# Patient Record
Sex: MALE | Race: WHITE | Employment: OTHER | URBAN - METROPOLITAN AREA
[De-identification: names, ages, dates, MRNs, and addresses within clinical notes are randomized per-mention and may not be internally consistent; named-entity substitution may affect disease eponyms.]

---

## 2017-03-22 ENCOUNTER — HOSPITAL ENCOUNTER (OUTPATIENT)
Dept: CT IMAGING | Age: 62
Discharge: HOME | End: 2017-03-22
Attending: OTOLARYNGOLOGY
Payer: MEDICARE

## 2017-03-22 DIAGNOSIS — J32.9 CHRONIC SINUSITIS, UNSPECIFIED LOCATION: ICD-10-CM

## 2017-03-22 PROCEDURE — 70486 CT MAXILLOFACIAL W/O DYE: CPT

## 2019-08-19 ENCOUNTER — HOSPITAL ENCOUNTER (EMERGENCY)
Age: 64
Discharge: HOME | End: 2019-08-20
Attending: EMERGENCY MEDICINE
Payer: MEDICARE

## 2019-08-19 VITALS
OXYGEN SATURATION: 95 % | RESPIRATION RATE: 20 BRPM | HEIGHT: 73 IN | BODY MASS INDEX: 34.19 KG/M2 | TEMPERATURE: 101.4 F | HEART RATE: 108 BPM | DIASTOLIC BLOOD PRESSURE: 63 MMHG | WEIGHT: 258 LBS | SYSTOLIC BLOOD PRESSURE: 94 MMHG

## 2019-08-19 DIAGNOSIS — J01.01 ACUTE RECURRENT MAXILLARY SINUSITIS: Primary | ICD-10-CM

## 2019-08-19 PROCEDURE — 96372 THER/PROPH/DIAG INJ SC/IM: CPT | Performed by: EMERGENCY MEDICINE

## 2019-08-19 PROCEDURE — 74011250636 HC RX REV CODE- 250/636: Performed by: EMERGENCY MEDICINE

## 2019-08-19 PROCEDURE — 99282 EMERGENCY DEPT VISIT SF MDM: CPT | Performed by: EMERGENCY MEDICINE

## 2019-08-19 RX ORDER — CEFUROXIME AXETIL 500 MG/1
500 TABLET ORAL 2 TIMES DAILY
Qty: 20 TAB | Refills: 0 | Status: SHIPPED | OUTPATIENT
Start: 2019-08-19 | End: 2019-08-29

## 2019-08-19 RX ADMIN — LIDOCAINE HYDROCHLORIDE 1 G: 10 INJECTION, SOLUTION INFILTRATION; PERINEURAL at 23:53

## 2019-08-20 NOTE — ED TRIAGE NOTES
Pt states that he has had a sinus infection on and off for several months. Was treated with doxycycline but that has not helped. Continues to have a fever and taking motrin, ASA and nyquil without relief.   States that he has an appt with Dr Ana Menezes in Sept

## 2019-08-20 NOTE — ED PROVIDER NOTES
58-year-old male presenting for fever and sinus pressure. States that he has known sinus issues and gets sinusitis for 5 times a year. He is followed by Dr. Roe Campuzano and intends to have surgery on his sinuses because they do not drain well. Reports that 2 weeks ago he started having some sinus pressure and was seen at an urgent care where they treated him with doxycycline for 5 days in spite of telling them that he usually gets treated with cefuroxime for 10 days. Symptoms got a little bit better but then continued to worsen thereafter. The worsening headache and fever started today. He feels congestion in his face, in his ears and feels like something is draining down the back of his throat. This is very similar to prior episodes. He denies vision changes, hearing changes, nausea, vomiting, chest pain, shortness of breath, cough, or diarrhea. He has had no rashes. The history is provided by the patient. Fever    This is a recurrent problem. The current episode started yesterday. The problem occurs constantly. The maximum temperature noted was 102 - 102.9 F. The temperature was taken using an oral thermometer. Associated symptoms include congestion, headaches and sore throat. Pertinent negatives include no chest pain, no fussiness, no sleepiness, no diarrhea, no vomiting, no tugging at ear, no muscle aches, no cough, no shortness of breath, no mental status change, no neck pain, no rash and no urinary symptoms. He has tried nothing for the symptoms. The treatment provided no relief.         Past Medical History:   Diagnosis Date    Arthritis     Cancer [facility]) 2001    prostate    Chronic pain     back    Chronic sinusitis     GERD (gastroesophageal reflux disease)     controlled w/med    Hiatal hernia     Hypertension     controlled w/med    Nausea & vomiting     Personal history of prostate cancer     Psychiatric disorder     depression       Past Surgical History:   Procedure Laterality Date    BIOPSY PROSTATE      HX ANKLE FRACTURE [address]    ligament repair    HX APPENDECTOMY  1966    HX LUMBAR LAMINECTOMY  2001    L5-S1 microsurgery    HX PROSTATECTOMY  2001    LAP,CHOLECYSTECTOMY           Family History:   Problem Relation Age of Onset    Thyroid Disease Mother     Heart Disease Father     Hypertension Father     Cancer Maternal Uncle        Social History     Socioeconomic History    Marital status:      Spouse name: Not on file    Number of children: Not on file    Years of education: Not on file    Highest education level: Not on file   Occupational History    Not on file   Social Needs    Financial resource strain: Not on file    Food insecurity:     Worry: Not on file     Inability: Not on file    Transportation needs:     Medical: Not on file     Non-medical: Not on file   Tobacco Use    Smoking status: Never Smoker    Smokeless tobacco: Never Used   Substance and Sexual Activity    Alcohol use: Yes     Comment: rare    Drug use: No    Sexual activity: Not on file   Lifestyle    Physical activity:     Days per week: Not on file     Minutes per session: Not on file    Stress: Not on file   Relationships    Social connections:     Talks on phone: Not on file     Gets together: Not on file     Attends Tenriism service: Not on file     Active member of club or organization: Not on file     Attends meetings of clubs or organizations: Not on file     Relationship status: Not on file    Intimate partner violence:     Fear of current or ex partner: Not on file     Emotionally abused: Not on file     Physically abused: Not on file     Forced sexual activity: Not on file   Other Topics Concern    Not on file   Social History Narrative    Not on file         ALLERGIES: Pcn [penicillins]    Review of Systems   Constitutional: Positive for fever. HENT: Positive for congestion, sinus pressure, sinus pain and sore throat. Negative for trouble swallowing and voice change. Respiratory: Negative for cough and shortness of breath. Cardiovascular: Negative for chest pain. Gastrointestinal: Negative for diarrhea and vomiting. Musculoskeletal: Negative for neck pain. Skin: Negative for rash. Neurological: Positive for headaches. All other systems reviewed and are negative. Vitals:    08/19/19 2237   BP: 94/63   Pulse: (!) 108   Resp: 20   Temp: (!) 101.4 °F (38.6 °C)   SpO2: 95%   Weight: 117 kg (258 lb)   Height: 6' 1\" (1.854 m)            Physical Exam   Constitutional: He is oriented to person, place, and time. He appears well-developed and well-nourished. HENT:   Head: Normocephalic and atraumatic. Right Ear: External ear normal.   Left Ear: External ear normal.   Tenderness over the sinuses, left greater than right, maxillary worse than frontal   Eyes: Pupils are equal, round, and reactive to light. Conjunctivae and EOM are normal.   Neck: Normal range of motion. Neck supple. Cardiovascular: Normal rate, regular rhythm, normal heart sounds and intact distal pulses. Pulmonary/Chest: Effort normal and breath sounds normal.   Abdominal: Soft. Bowel sounds are normal.   Musculoskeletal: Normal range of motion. He exhibits no deformity. Neurological: He is alert and oriented to person, place, and time. No cranial nerve deficit. Skin: Skin is warm and dry. Psychiatric: He has a normal mood and affect. His behavior is normal.   Nursing note and vitals reviewed. MDM  Number of Diagnoses or Management Options  Acute recurrent maxillary sinusitis:   Diagnosis management comments: 59-year-old male presenting for recurrent sinus infection type symptoms and a fever. He appears clinically well. He tells me that he gets this for 5 times a year and thinks this is exactly like prior episodes.   Given how well the patient looks and his story of knowing that this is likely a sinus infection I discussed whether the patient wanted to just be started on antibiotics and avoid a major work-up. He expressed that he did. He reports that cefuroxime is what works for his sinus infections. We will give him an intramuscular shot of ceftriaxone here in the emergency department and discharged with a 10-day course of Ceftin. I clearly gave the patient return precautions. In addition to the above information the patient does report that he is a little bit shakier and probably has a slightly elevated heart rate because he is trying to get off of narcotic pain medications which she has been on for quite some time. He is doing this in conjunction with his pain management doctor.     Risk of Complications, Morbidity, and/or Mortality  Presenting problems: moderate  Diagnostic procedures: moderate  Management options: moderate    Patient Progress  Patient progress: improved         Procedures

## 2019-08-20 NOTE — DISCHARGE INSTRUCTIONS
Start the antibiotic that you reported to me works for you. Give it 48 hours. If you are not having any change in your symptoms or if they are worsening can always return for further evaluation. Otherwise follow-up with your ear nose and throat doctor at your earliest convenience.

## 2021-01-18 ENCOUNTER — HOSPITAL ENCOUNTER (EMERGENCY)
Age: 66
Discharge: LEFT BEFORE DISCHARGE | End: 2021-01-18
Attending: EMERGENCY MEDICINE
Payer: MEDICARE

## 2021-01-18 VITALS
HEART RATE: 104 BPM | OXYGEN SATURATION: 96 % | TEMPERATURE: 98.3 F | RESPIRATION RATE: 18 BRPM | DIASTOLIC BLOOD PRESSURE: 108 MMHG | WEIGHT: 260 LBS | BODY MASS INDEX: 34.46 KG/M2 | HEIGHT: 73 IN | SYSTOLIC BLOOD PRESSURE: 175 MMHG

## 2021-01-18 PROCEDURE — 75810000275 HC EMERGENCY DEPT VISIT NO LEVEL OF CARE

## 2021-01-18 NOTE — ED TRIAGE NOTES
Pt arrives ambulatory to triage with mask in place. Stating \"I should've said I had covid 19 and I would've got back faster\". States he has back pain \"up and down my spine and up and down my legs, I'm a chronic pain pt, I need a ct\". Reports his pain management doctor ordered a ct scan for him, but he came here instead. Pt reports he had injuries in the 80s-90s to his back. Reports he takes oxycodone and diazepam for pain at home. Last dose 0500 this morning. Reports he received injections last Thursday afternoon, that helped for 2 days and got worse again.

## 2021-02-15 ENCOUNTER — HOSPITAL ENCOUNTER (OUTPATIENT)
Dept: INTERVENTIONAL RADIOLOGY/VASCULAR | Age: 66
Discharge: HOME | End: 2021-02-15
Attending: ORTHOPAEDIC SURGERY

## 2021-02-19 ENCOUNTER — HOSPITAL ENCOUNTER (OUTPATIENT)
Dept: INTERVENTIONAL RADIOLOGY/VASCULAR | Age: 66
Discharge: HOME | End: 2021-02-19
Attending: ORTHOPAEDIC SURGERY
Payer: MEDICARE

## 2021-02-19 ENCOUNTER — HOSPITAL ENCOUNTER (OUTPATIENT)
Dept: CT IMAGING | Age: 66
Discharge: HOME | End: 2021-02-19
Attending: ORTHOPAEDIC SURGERY
Payer: MEDICARE

## 2021-02-19 VITALS
WEIGHT: 260 LBS | OXYGEN SATURATION: 95 % | SYSTOLIC BLOOD PRESSURE: 158 MMHG | BODY MASS INDEX: 34.46 KG/M2 | DIASTOLIC BLOOD PRESSURE: 72 MMHG | HEART RATE: 79 BPM | HEIGHT: 73 IN

## 2021-02-19 DIAGNOSIS — M51.26 DISPLACEMENT OF LUMBAR INTERVERTEBRAL DISC: ICD-10-CM

## 2021-02-19 PROCEDURE — 74011000636 HC RX REV CODE- 636: Performed by: PHYSICIAN ASSISTANT

## 2021-02-19 PROCEDURE — 74011250636 HC RX REV CODE- 250/636: Performed by: PHYSICIAN ASSISTANT

## 2021-02-19 PROCEDURE — 77030014143 HC TY PUNC LUMBR BD -A

## 2021-02-19 PROCEDURE — 62304 MYELOGRAPHY LUMBAR INJECTION: CPT

## 2021-02-19 PROCEDURE — 77030003666 HC NDL SPINAL BD -A

## 2021-02-19 PROCEDURE — 74011000250 HC RX REV CODE- 250: Performed by: PHYSICIAN ASSISTANT

## 2021-02-19 PROCEDURE — 72132 CT LUMBAR SPINE W/DYE: CPT

## 2021-02-19 RX ORDER — GUAIFENESIN 100 MG/5ML
81 LIQUID (ML) ORAL 2 TIMES DAILY
COMMUNITY

## 2021-02-19 RX ORDER — LIDOCAINE HYDROCHLORIDE 20 MG/ML
40-120 INJECTION, SOLUTION INFILTRATION; PERINEURAL ONCE
Status: COMPLETED | OUTPATIENT
Start: 2021-02-19 | End: 2021-02-19

## 2021-02-19 RX ORDER — HYDROMORPHONE HYDROCHLORIDE 1 MG/ML
1 INJECTION, SOLUTION INTRAMUSCULAR; INTRAVENOUS; SUBCUTANEOUS ONCE
Status: COMPLETED | OUTPATIENT
Start: 2021-02-19 | End: 2021-02-19

## 2021-02-19 RX ADMIN — LIDOCAINE HYDROCHLORIDE 80 MG: 20 INJECTION, SOLUTION INFILTRATION; PERINEURAL at 08:57

## 2021-02-19 RX ADMIN — IOPAMIDOL 8 ML: 408 INJECTION, SOLUTION INTRATHECAL at 09:02

## 2021-02-19 RX ADMIN — HYDROMORPHONE HYDROCHLORIDE 1 MG: 1 INJECTION, SOLUTION INTRAMUSCULAR; INTRAVENOUS; SUBCUTANEOUS at 09:15

## 2021-02-19 NOTE — DISCHARGE INSTRUCTIONS
[address]  Department of Interventional Radiology  [phone] Office  [phone] Fax  POST LUMBAR PUNCTURE/MYELOGRAM/INTRATHECAL CHEMOTHERAPY DISCHARGE INSTRUCTIONS  General Information:  Lumbar Puncture: A LP is done to help diagnose several disorders, like pseudo tumor, migraines, meningitis, and multiple sclerosis. It involves a puncture (usually in the lower spine) into the sac that protects the spinal column. A sample of the fluid in that space is removed and tested in the lab. Myelogram:     A Myelogram involves a lumbar puncture, and instead of removing fluid, contrast will be injected into the sac surrounding the spinal column. It is done to visualize the spinal column, nerve roots, spinal canal, vertebral discs and disc space. It is usually done to diagnose back pain with unknown cause or in preparation for surgery. After the injection, a CT scan will be done, usually within two hours of the injection. Intrathecal Chemotherapy:      Chemotherapy can be given in many forms. Intrathecal chemo involves a lumbar puncture, and instead of removing fluid, the chemo will be injected into the space. After any of procedures, you will be asked to lie flat on your back for 4-6 hours to prevent complications. You should also rest for 24 hours after you go home, and force fluids. If you have a headache, you should take Tylenol or acetaminophen. Call If:     You should call your Physician and/or the Radiology Nurse if you develop a headache that is not relieved by Tylenol, and worsens when you stand and eases when you lie down, you need to call. You may have developed what is referred to as a spinal headache. Our physician's will probably advise you to be on strict bed rest for 24 hours, to drink lots of fluids and caffeine. If this does not help the head pain, call again the next day.  You should call if you have bleeding other than a small spot on your bandage. You should call if you have any numbness, tingling, weakness, fever, chills, urinary retention, severe itching, rash, welts, swelling, or confusion. Follow-Up Instructions: See the doctor who ordered your procedure as he/she has instructed. If you had a Lumbar Puncture or Myelogram, your results should be available to your ordering doctor in 3-5 business days. You can remove your dressing in 24 hours and shower regularly. Do not bathe or swim for 72 hours. To Reach Us: If you have any questions about your procedure, please call the Interventional Radiology department at [phone]. After business hours (5pm) and weekends, call the answering service at [phone] and ask for the Radiologist on call to be paged. Si tiene Preguntas acerca del procedimiento, por favor llame al departamento de Radiología Intervencional al [phone]. Después de horas de oficina (5 pm) y los fines de [place], llamar al [facility] [address] al [phone] y pregunte por el Radiologo de [facility]. Interventional Radiology General Nurse Discharge    After general anesthesia or intravenous sedation, for 24 hours or while taking prescription Narcotics:  · Limit your activities  · Do not drive and operate hazardous machinery  · Do not make important personal or business decisions  · Do  not drink alcoholic beverages  · If you have not urinated within 8 hours after discharge, please contact your surgeon on call. * Please give a list of your current medications to your Primary Care Provider. * Please update this list whenever your medications are discontinued, doses are     changed, or new medications (including over-the-counter products) are added. * Please carry medication information at all times in case of emergency situations.     These are general instructions for a healthy lifestyle:    No smoking/ No tobacco products/ Avoid exposure to second hand smoke  Surgeon General's Warning:  Quitting smoking now greatly reduces serious risk to your health. Obesity, smoking, and sedentary lifestyle greatly increases your risk for illness  A healthy diet, regular physical exercise & weight monitoring are important for maintaining a healthy lifestyle    You may be retaining fluid if you have a history of heart failure or if you experience any of the following symptoms:  Weight gain of 3 pounds or more overnight or 5 pounds in a week, increased swelling in our hands or feet or shortness of breath while lying flat in bed. Please call your doctor as soon as you notice any of these symptoms; do not wait until your next office visit. Recognize signs and symptoms of STROKE:  F-face looks uneven    A-arms unable to move or move unevenly    S-speech slurred or non-existent    T-time-call 911 as soon as signs and symptoms begin-DO NOT go       Back to bed or wait to see if you get better-TIME IS BRAIN.     Patient Signature:  Date: 2/19/2021  Discharging Nurse: Silvia Galindo

## 2021-02-19 NOTE — PROGRESS NOTES
Recovery period without difficulty. Pt alert and oriented and denies pain. Dressing is clean, dry, and intact. Reviewed discharge instructions with patient and spouse, both verbalized understanding. Pt escorted to lobby discharge area via wheelchair. none

## 2021-03-11 ENCOUNTER — HOSPITAL ENCOUNTER (OUTPATIENT)
Dept: SURGERY | Age: 66
Discharge: HOME | End: 2021-03-11
Payer: MEDICARE

## 2021-03-11 VITALS
HEIGHT: 73 IN | WEIGHT: 271.6 LBS | DIASTOLIC BLOOD PRESSURE: 64 MMHG | OXYGEN SATURATION: 94 % | BODY MASS INDEX: 36 KG/M2 | RESPIRATION RATE: 20 BRPM | SYSTOLIC BLOOD PRESSURE: 114 MMHG | TEMPERATURE: 97.1 F | HEART RATE: 76 BPM

## 2021-03-11 LAB
BACTERIA SPEC CULT: NORMAL
SERVICE CMNT-IMP: NORMAL

## 2021-03-11 PROCEDURE — 87641 MR-STAPH DNA AMP PROBE: CPT

## 2021-03-11 RX ORDER — OXYBUTYNIN CHLORIDE 5 MG/1
1 TABLET ORAL AS NEEDED
COMMUNITY
Start: 2021-01-05

## 2021-03-11 RX ORDER — OXYCODONE HCL 10 MG/1
10 TABLET, FILM COATED, EXTENDED RELEASE ORAL EVERY 12 HOURS
COMMUNITY

## 2021-03-11 RX ORDER — ACETAMINOPHEN 500 MG
1000 TABLET ORAL
COMMUNITY

## 2021-03-11 RX ORDER — PAROXETINE HYDROCHLORIDE 20 MG/1
20 TABLET, FILM COATED ORAL
COMMUNITY

## 2021-03-11 NOTE — PERIOP NOTES
Patient confirms name and . Order to obtain consent found in EHR and matches case posting.    Pt verbalizes understanding of 1 visitor policy.    Type 1B surgery,  assessment complete.    Labs per surgeon: MRSA/MSSA nasal swab  Labs per anesthesia protocol: none  EKG: not indicated  Glucose:not indicated    Pt made aware of 1 visitor policy.    Patient verbalizes understanding to if not notified of appointment for COVID-19 swab within the 7 business days prior to surgery, call [phone]. Date Covid swab completed 3/11/21.    Medication list updated today.  Pt did not bring medication bottles or updated list today, but provided list of medications from memory to the best of their ability. Pt answered medical and surgical history questions to the best of their ability.    Hibiclens and instructions given per hospital policy with verbal instructions and handout.    Patient provided with and instructed on educational handouts including Guide to Surgery, Pain Management, Hand Hygiene, Blood Transfusion Education, and [facility] Brochure.    Patient answered medical/surgical history questions at their best of ability. All prior to admission medications documented in [facility].     Patient instructed on the following:  Arrive at MAIN Entrance, time of arrival to be called the day before by 1700  NPO after midnight including gum, mints, and ice chips.  Responsible adult must drive patient to the hospital, stay during surgery, and patient will require supervision 24 hours after anesthesia.  Use hibiclens in shower the night before surgery and on the morning of surgery.  Leave all valuables (money and jewelry) at home but bring insurance card and ID on DOS.  Do not wear make-up, nail polish, lotions, cologne, perfumes, powders, or oil on skin.    Patient teach back successful and patient demonstrates knowledge of instruction.

## 2021-03-11 NOTE — PERIOP NOTES
PLEASE CONTINUE TAKING ALL PRESCRIPTION MEDICATIONS UP TO THE DAY OF SURGERY UNLESS OTHERWISE DIRECTED BELOW. DISCONTINUE all vitamins and supplements 7 days prior to surgery. DISCONTINUE Non-Steriodal Anti-Inflammatory (NSAIDS) such as Advil and Aleve 5 days prior to surgery. Home Medications to take  the day of surgery   Valium / diazepam  Oxycodone   Paxil / paroxetine  Prevacid/ lansoprazole      Oxybutynin if needed     Home Medications   to Hold    Aspirin - hold 5 days prior to surgery      DISCONTINUE all vitamins and supplements 7 days prior to surgery. DISCONTINUE Non-Steriodal Anti-Inflammatory (NSAIDS) such as Advil and Aleve 5 days prior to surgery. Comments         *Visitor policy of 1 visitor per patient discussed. Please do not bring home medications with you on the day of surgery unless otherwise directed by your nurse. If you are instructed to bring home medications, please give them to your nurse as they will be administered by the nursing staff. If you have any questions, please call [facility] [phone] or [facility] [phone]. A copy of this note was provided to the patient for reference.

## 2021-03-12 NOTE — H&P
Medications:allopurinol (300 mg , .);ibuprofen (400 mg , .);lansoprazole (30 mg , .);lisinopril (20 mg , .);oxybutynin chloride (5 mg , .);oxycodone (10 mg , .);paroxetine HCl (20 mg , .)    CC: LOW BACK AND RIGHT LEG PAIN    HPI:  The patient is a 49-year-old gentleman. He has chronic pain. He sees the [facility] for Pain. I am not really sure what his pain issue is, but I think they never really found anything really significant. Starting 01/16/2021, he started having acute worsening pain. It is the worse pain he has ever had. It is going down the right leg in an L5 distribution to the top of the foot with numbness and tingling. No left leg pain. This has really incapacitated him. He does have a spinal cord stimulator placed in 2018 which helped about 65% with his pain. His new pain has been much worse than anything he has had. Could not get an MRI scan so we got a myelogram CT scan. I reviewed it today. It shows a right-sided L4-5 disc herniation with a fragment extending down the back of the L5 body. This would account for his pain. He says he has already had an L5 block that did not help. We talked about the possibility of doing more injections, waiting or doing surgery. He says he cannot put up with this anymore. He needs surgery. I told him it would a right-sided L4-5 laminectomy discectomy outpatient with possibly an overnight stay. He will have restrictions for 6 weeks. No bending, lifting, strenuous activity for 6 weeks until the annulus is healed. I have talked to he and his wife in detail. I went through all the risks, including death, paralysis, infection, bleeding, transfusion, heart attack, stroke, clot in the leg, clot in the lung, dural tear, recurrent disc herniation and failure to get  pain relief. We went through his medical history. He has some chronic renal failure from antiinflammatory use. He takes two 81 mg aspirins a day for a TIA I think he had in 2005.   He has not had one since. I would want him to stop the aspirin 10 days in advance. Exam:   Head: Normocephalic and atraumatic. Eyes: Conjunctivae are normal. PERRLA   Neck: Normal range of motion. Neck supple. Cardiovascular: Regular rate and rhythm, S1S2 present or without murmur or extra heart sounds  Pulmonary/Chest: chest clear, no wheezing, rales, normal symmetric air entry. Abdominal: soft, non-tender, without masses or organomegaly, normal bowel sounds      ASSESSMENT AND PLAN:  We will see about lining him up for the right L4-5 laminotomy discectomy. He tells me that he is normally on chronic medication OxyContin 10 mg b.i.d. and oxycodone 5 mg q. 8 hours. With the last prescription, Ty did not have the OxyContin so he has run out and his pain is markedly worse. He has tried to get in touch with the [facility] for Pain, but has been unable. Normally gets his medications sent by [facility] on [address]. Since they do not have the medication, they called around and they do have it at [facility] on [address]. I will see about contacting one of the doctors or nurse practitioners to see if they can send that prescription for the oxycodone extended release to [facility] on [address]. I will see him at surgery.     Surgery: Right L4-5 laminotomy, discectomy    Electronically Signed By Nati Osman MD

## 2021-03-17 ENCOUNTER — ANESTHESIA EVENT (OUTPATIENT)
Dept: SURGERY | Age: 66
End: 2021-03-17
Payer: MEDICARE

## 2021-03-18 ENCOUNTER — APPOINTMENT (OUTPATIENT)
Dept: GENERAL RADIOLOGY | Age: 66
End: 2021-03-18
Attending: ORTHOPAEDIC SURGERY
Payer: MEDICARE

## 2021-03-18 ENCOUNTER — HOSPITAL ENCOUNTER (OUTPATIENT)
Age: 66
Setting detail: OUTPATIENT SURGERY
Discharge: HOME | End: 2021-03-18
Attending: ORTHOPAEDIC SURGERY | Admitting: ORTHOPAEDIC SURGERY
Payer: MEDICARE

## 2021-03-18 ENCOUNTER — ANESTHESIA (OUTPATIENT)
Dept: SURGERY | Age: 66
End: 2021-03-18
Payer: MEDICARE

## 2021-03-18 VITALS
OXYGEN SATURATION: 98 % | WEIGHT: 271 LBS | RESPIRATION RATE: 16 BRPM | HEART RATE: 96 BPM | TEMPERATURE: 97.8 F | SYSTOLIC BLOOD PRESSURE: 148 MMHG | DIASTOLIC BLOOD PRESSURE: 72 MMHG | BODY MASS INDEX: 35.92 KG/M2 | HEIGHT: 73 IN

## 2021-03-18 DIAGNOSIS — M51.26 LUMBAR HERNIATED DISC: Primary | ICD-10-CM

## 2021-03-18 LAB — HGB BLD-MCNC: 15.4 G/DL (ref 13.6–17.2)

## 2021-03-18 PROCEDURE — 77030038552 HC DRN WND MDII -A: Performed by: ORTHOPAEDIC SURGERY

## 2021-03-18 PROCEDURE — 77030040922 HC BLNKT HYPOTHRM STRY -A: Performed by: ANESTHESIOLOGY

## 2021-03-18 PROCEDURE — 74011000250 HC RX REV CODE- 250: Performed by: REGISTERED NURSE

## 2021-03-18 PROCEDURE — 76210000021 HC REC RM PH II 0.5 TO 1 HR: Performed by: ORTHOPAEDIC SURGERY

## 2021-03-18 PROCEDURE — 76010000171 HC OR TIME 2 TO 2.5 HR INTENSV-TIER 1: Performed by: ORTHOPAEDIC SURGERY

## 2021-03-18 PROCEDURE — 72020 X-RAY EXAM OF SPINE 1 VIEW: CPT

## 2021-03-18 PROCEDURE — 77030019908 HC STETH ESOPH SIMS -A: Performed by: ANESTHESIOLOGY

## 2021-03-18 PROCEDURE — 76060000035 HC ANESTHESIA 2 TO 2.5 HR: Performed by: ORTHOPAEDIC SURGERY

## 2021-03-18 PROCEDURE — 74011000250 HC RX REV CODE- 250: Performed by: ORTHOPAEDIC SURGERY

## 2021-03-18 PROCEDURE — 77030031139 HC SUT VCRL2 J&J -A: Performed by: ORTHOPAEDIC SURGERY

## 2021-03-18 PROCEDURE — 85018 HEMOGLOBIN: CPT

## 2021-03-18 PROCEDURE — 76210000017 HC OR PH I REC 1.5 TO 2 HR: Performed by: ORTHOPAEDIC SURGERY

## 2021-03-18 PROCEDURE — 77030039425 HC BLD LARYNG TRULITE DISP TELE -A: Performed by: ANESTHESIOLOGY

## 2021-03-18 PROCEDURE — 77030018673: Performed by: ORTHOPAEDIC SURGERY

## 2021-03-18 PROCEDURE — 74011250636 HC RX REV CODE- 250/636: Performed by: REGISTERED NURSE

## 2021-03-18 PROCEDURE — 63030 LAMOT DCMPRN NRV RT 1 LMBR: CPT | Performed by: ORTHOPAEDIC SURGERY

## 2021-03-18 PROCEDURE — 74011250636 HC RX REV CODE- 250/636: Performed by: ORTHOPAEDIC SURGERY

## 2021-03-18 PROCEDURE — 74011000272 HC RX REV CODE- 272: Performed by: ORTHOPAEDIC SURGERY

## 2021-03-18 PROCEDURE — 77030037088 HC TUBE ENDOTRACH ORAL NSL COVD-A: Performed by: ANESTHESIOLOGY

## 2021-03-18 PROCEDURE — 74011000250 HC RX REV CODE- 250: Performed by: NURSE ANESTHETIST, CERTIFIED REGISTERED

## 2021-03-18 PROCEDURE — 74011250637 HC RX REV CODE- 250/637

## 2021-03-18 PROCEDURE — 2709999900 HC NON-CHARGEABLE SUPPLY: Performed by: ORTHOPAEDIC SURGERY

## 2021-03-18 PROCEDURE — 77030012894: Performed by: ORTHOPAEDIC SURGERY

## 2021-03-18 PROCEDURE — 74011250637 HC RX REV CODE- 250/637: Performed by: ANESTHESIOLOGY

## 2021-03-18 PROCEDURE — 74011250636 HC RX REV CODE- 250/636: Performed by: NURSE ANESTHETIST, CERTIFIED REGISTERED

## 2021-03-18 PROCEDURE — 77030025623 HC BUR RND PRECIS STRY -D: Performed by: ORTHOPAEDIC SURGERY

## 2021-03-18 PROCEDURE — 77030029099 HC BN WAX SSPC -A: Performed by: ORTHOPAEDIC SURGERY

## 2021-03-18 PROCEDURE — 74011250636 HC RX REV CODE- 250/636: Performed by: ANESTHESIOLOGY

## 2021-03-18 PROCEDURE — 77030028270 HC SRGFL HEMSTAT MTRX J&J -C: Performed by: ORTHOPAEDIC SURGERY

## 2021-03-18 RX ORDER — LIDOCAINE HYDROCHLORIDE 20 MG/ML
INJECTION, SOLUTION EPIDURAL; INFILTRATION; INTRACAUDAL; PERINEURAL AS NEEDED
Status: DISCONTINUED | OUTPATIENT
Start: 2021-03-18 | End: 2021-03-18 | Stop reason: HOSPADM

## 2021-03-18 RX ORDER — FENTANYL CITRATE 50 UG/ML
INJECTION, SOLUTION INTRAMUSCULAR; INTRAVENOUS AS NEEDED
Status: DISCONTINUED | OUTPATIENT
Start: 2021-03-18 | End: 2021-03-18 | Stop reason: HOSPADM

## 2021-03-18 RX ORDER — SODIUM CHLORIDE, SODIUM LACTATE, POTASSIUM CHLORIDE, CALCIUM CHLORIDE 600; 310; 30; 20 MG/100ML; MG/100ML; MG/100ML; MG/100ML
75 INJECTION, SOLUTION INTRAVENOUS CONTINUOUS
Status: DISCONTINUED | OUTPATIENT
Start: 2021-03-18 | End: 2021-03-18 | Stop reason: HOSPADM

## 2021-03-18 RX ORDER — NEOSTIGMINE METHYLSULFATE 1 MG/ML
INJECTION, SOLUTION INTRAVENOUS AS NEEDED
Status: DISCONTINUED | OUTPATIENT
Start: 2021-03-18 | End: 2021-03-18 | Stop reason: HOSPADM

## 2021-03-18 RX ORDER — VANCOMYCIN HYDROCHLORIDE 1 G/20ML
INJECTION, POWDER, LYOPHILIZED, FOR SOLUTION INTRAVENOUS AS NEEDED
Status: DISCONTINUED | OUTPATIENT
Start: 2021-03-18 | End: 2021-03-18 | Stop reason: HOSPADM

## 2021-03-18 RX ORDER — MIDAZOLAM HYDROCHLORIDE 1 MG/ML
2 INJECTION, SOLUTION INTRAMUSCULAR; INTRAVENOUS ONCE
Status: DISCONTINUED | OUTPATIENT
Start: 2021-03-18 | End: 2021-03-18 | Stop reason: HOSPADM

## 2021-03-18 RX ORDER — EPHEDRINE SULFATE/0.9% NACL/PF 50 MG/5 ML
SYRINGE (ML) INTRAVENOUS AS NEEDED
Status: DISCONTINUED | OUTPATIENT
Start: 2021-03-18 | End: 2021-03-18 | Stop reason: HOSPADM

## 2021-03-18 RX ORDER — CEPHALEXIN 500 MG/1
500 CAPSULE ORAL 3 TIMES DAILY
Qty: 10 CAP | Refills: 0 | Status: SHIPPED | OUTPATIENT
Start: 2021-03-18 | End: 2021-03-28

## 2021-03-18 RX ORDER — ACETAMINOPHEN 500 MG
1000 TABLET ORAL ONCE
Status: DISCONTINUED | OUTPATIENT
Start: 2021-03-18 | End: 2021-03-18 | Stop reason: HOSPADM

## 2021-03-18 RX ORDER — HYDROMORPHONE HYDROCHLORIDE 2 MG/ML
INJECTION, SOLUTION INTRAMUSCULAR; INTRAVENOUS; SUBCUTANEOUS AS NEEDED
Status: DISCONTINUED | OUTPATIENT
Start: 2021-03-18 | End: 2021-03-18 | Stop reason: HOSPADM

## 2021-03-18 RX ORDER — FENTANYL CITRATE 50 UG/ML
100 INJECTION, SOLUTION INTRAMUSCULAR; INTRAVENOUS AS NEEDED
Status: DISCONTINUED | OUTPATIENT
Start: 2021-03-18 | End: 2021-03-18 | Stop reason: HOSPADM

## 2021-03-18 RX ORDER — OXYCODONE HYDROCHLORIDE 5 MG/1
5 TABLET ORAL
Status: COMPLETED | OUTPATIENT
Start: 2021-03-18 | End: 2021-03-18

## 2021-03-18 RX ORDER — SODIUM CHLORIDE, SODIUM LACTATE, POTASSIUM CHLORIDE, CALCIUM CHLORIDE 600; 310; 30; 20 MG/100ML; MG/100ML; MG/100ML; MG/100ML
INJECTION, SOLUTION INTRAVENOUS
Status: DISCONTINUED | OUTPATIENT
Start: 2021-03-18 | End: 2021-03-18 | Stop reason: HOSPADM

## 2021-03-18 RX ORDER — LIDOCAINE HYDROCHLORIDE AND EPINEPHRINE 10; 10 MG/ML; UG/ML
INJECTION, SOLUTION INFILTRATION; PERINEURAL AS NEEDED
Status: DISCONTINUED | OUTPATIENT
Start: 2021-03-18 | End: 2021-03-18 | Stop reason: HOSPADM

## 2021-03-18 RX ORDER — DEXAMETHASONE SODIUM PHOSPHATE 4 MG/ML
INJECTION, SOLUTION INTRA-ARTICULAR; INTRALESIONAL; INTRAMUSCULAR; INTRAVENOUS; SOFT TISSUE AS NEEDED
Status: DISCONTINUED | OUTPATIENT
Start: 2021-03-18 | End: 2021-03-18 | Stop reason: HOSPADM

## 2021-03-18 RX ORDER — KETAMINE HYDROCHLORIDE 50 MG/ML
INJECTION, SOLUTION INTRAMUSCULAR; INTRAVENOUS AS NEEDED
Status: DISCONTINUED | OUTPATIENT
Start: 2021-03-18 | End: 2021-03-18 | Stop reason: HOSPADM

## 2021-03-18 RX ORDER — DIPHENHYDRAMINE HYDROCHLORIDE 50 MG/ML
12.5 INJECTION, SOLUTION INTRAMUSCULAR; INTRAVENOUS
Status: DISCONTINUED | OUTPATIENT
Start: 2021-03-18 | End: 2021-03-18 | Stop reason: HOSPADM

## 2021-03-18 RX ORDER — ROCURONIUM BROMIDE 10 MG/ML
INJECTION, SOLUTION INTRAVENOUS AS NEEDED
Status: DISCONTINUED | OUTPATIENT
Start: 2021-03-18 | End: 2021-03-18 | Stop reason: HOSPADM

## 2021-03-18 RX ORDER — ONDANSETRON 2 MG/ML
INJECTION INTRAMUSCULAR; INTRAVENOUS AS NEEDED
Status: DISCONTINUED | OUTPATIENT
Start: 2021-03-18 | End: 2021-03-18 | Stop reason: HOSPADM

## 2021-03-18 RX ORDER — SODIUM CHLORIDE, SODIUM LACTATE, POTASSIUM CHLORIDE, CALCIUM CHLORIDE 600; 310; 30; 20 MG/100ML; MG/100ML; MG/100ML; MG/100ML
100 INJECTION, SOLUTION INTRAVENOUS CONTINUOUS
Status: DISCONTINUED | OUTPATIENT
Start: 2021-03-18 | End: 2021-03-18 | Stop reason: HOSPADM

## 2021-03-18 RX ORDER — LIDOCAINE HYDROCHLORIDE 10 MG/ML
0.1 INJECTION INFILTRATION; PERINEURAL AS NEEDED
Status: DISCONTINUED | OUTPATIENT
Start: 2021-03-18 | End: 2021-03-18 | Stop reason: HOSPADM

## 2021-03-18 RX ORDER — HYDROMORPHONE HYDROCHLORIDE 1 MG/ML
0.5 INJECTION, SOLUTION INTRAMUSCULAR; INTRAVENOUS; SUBCUTANEOUS
Status: DISCONTINUED | OUTPATIENT
Start: 2021-03-18 | End: 2021-03-18 | Stop reason: HOSPADM

## 2021-03-18 RX ORDER — PROPOFOL 10 MG/ML
INJECTION, EMULSION INTRAVENOUS AS NEEDED
Status: DISCONTINUED | OUTPATIENT
Start: 2021-03-18 | End: 2021-03-18 | Stop reason: HOSPADM

## 2021-03-18 RX ORDER — NALOXONE HYDROCHLORIDE 0.4 MG/ML
0.1 INJECTION, SOLUTION INTRAMUSCULAR; INTRAVENOUS; SUBCUTANEOUS AS NEEDED
Status: DISCONTINUED | OUTPATIENT
Start: 2021-03-18 | End: 2021-03-18 | Stop reason: HOSPADM

## 2021-03-18 RX ORDER — FLUMAZENIL 0.1 MG/ML
0.2 INJECTION INTRAVENOUS
Status: DISCONTINUED | OUTPATIENT
Start: 2021-03-18 | End: 2021-03-18 | Stop reason: HOSPADM

## 2021-03-18 RX ORDER — GLYCOPYRROLATE 0.2 MG/ML
INJECTION INTRAMUSCULAR; INTRAVENOUS AS NEEDED
Status: DISCONTINUED | OUTPATIENT
Start: 2021-03-18 | End: 2021-03-18 | Stop reason: HOSPADM

## 2021-03-18 RX ADMIN — KETAMINE HYDROCHLORIDE 50 MG: 50 INJECTION INTRAMUSCULAR; INTRAVENOUS at 12:05

## 2021-03-18 RX ADMIN — Medication 3 AMPULE: at 11:15

## 2021-03-18 RX ADMIN — PROPOFOL 200 MG: 10 INJECTION, EMULSION INTRAVENOUS at 12:05

## 2021-03-18 RX ADMIN — FENTANYL CITRATE 100 MCG: 50 INJECTION INTRAMUSCULAR; INTRAVENOUS at 12:05

## 2021-03-18 RX ADMIN — ONDANSETRON 4 MG: 2 INJECTION INTRAMUSCULAR; INTRAVENOUS at 12:52

## 2021-03-18 RX ADMIN — PHENYLEPHRINE HYDROCHLORIDE 120 MCG: 10 INJECTION INTRAVENOUS at 12:20

## 2021-03-18 RX ADMIN — PHENYLEPHRINE HYDROCHLORIDE 120 MCG: 10 INJECTION INTRAVENOUS at 12:33

## 2021-03-18 RX ADMIN — Medication 10 MG: at 12:37

## 2021-03-18 RX ADMIN — ROCURONIUM BROMIDE 50 MG: 10 INJECTION, SOLUTION INTRAVENOUS at 12:06

## 2021-03-18 RX ADMIN — HYDROMORPHONE HYDROCHLORIDE 0.4 MG: 2 INJECTION INTRAMUSCULAR; INTRAVENOUS; SUBCUTANEOUS at 14:03

## 2021-03-18 RX ADMIN — GLYCOPYRROLATE 0.8 MG: 0.2 INJECTION, SOLUTION INTRAMUSCULAR; INTRAVENOUS at 13:44

## 2021-03-18 RX ADMIN — PHENYLEPHRINE HYDROCHLORIDE 120 MCG: 10 INJECTION INTRAVENOUS at 13:25

## 2021-03-18 RX ADMIN — PROPOFOL 30 MG: 10 INJECTION, EMULSION INTRAVENOUS at 13:45

## 2021-03-18 RX ADMIN — LIDOCAINE HYDROCHLORIDE 100 MG: 20 INJECTION, SOLUTION EPIDURAL; INFILTRATION; INTRACAUDAL; PERINEURAL at 12:05

## 2021-03-18 RX ADMIN — PHENYLEPHRINE HYDROCHLORIDE 120 MCG: 10 INJECTION INTRAVENOUS at 12:53

## 2021-03-18 RX ADMIN — ROCURONIUM BROMIDE 10 MG: 10 INJECTION, SOLUTION INTRAVENOUS at 12:57

## 2021-03-18 RX ADMIN — CEFAZOLIN 3 G: 1 INJECTION, POWDER, FOR SOLUTION INTRAVENOUS at 12:27

## 2021-03-18 RX ADMIN — Medication 5 MG: at 12:48

## 2021-03-18 RX ADMIN — SODIUM CHLORIDE, SODIUM LACTATE, POTASSIUM CHLORIDE, AND CALCIUM CHLORIDE 100 ML/HR: 600; 310; 30; 20 INJECTION, SOLUTION INTRAVENOUS at 11:14

## 2021-03-18 RX ADMIN — PHENYLEPHRINE HYDROCHLORIDE 120 MCG: 10 INJECTION INTRAVENOUS at 12:35

## 2021-03-18 RX ADMIN — SODIUM CHLORIDE, SODIUM LACTATE, POTASSIUM CHLORIDE, AND CALCIUM CHLORIDE: 600; 310; 30; 20 INJECTION, SOLUTION INTRAVENOUS at 12:22

## 2021-03-18 RX ADMIN — Medication 10 MG: at 12:35

## 2021-03-18 RX ADMIN — PHENYLEPHRINE HYDROCHLORIDE 240 MCG: 10 INJECTION INTRAVENOUS at 12:37

## 2021-03-18 RX ADMIN — Medication 5 MG: at 13:44

## 2021-03-18 RX ADMIN — PROPOFOL 20 MG: 10 INJECTION, EMULSION INTRAVENOUS at 13:34

## 2021-03-18 RX ADMIN — PROPOFOL 20 MG: 10 INJECTION, EMULSION INTRAVENOUS at 13:41

## 2021-03-18 RX ADMIN — OXYCODONE 5 MG: 5 TABLET ORAL at 14:30

## 2021-03-18 RX ADMIN — ROCURONIUM BROMIDE 10 MG: 10 INJECTION, SOLUTION INTRAVENOUS at 13:19

## 2021-03-18 RX ADMIN — HYDROMORPHONE HYDROCHLORIDE 0.5 MG: 1 INJECTION, SOLUTION INTRAMUSCULAR; INTRAVENOUS; SUBCUTANEOUS at 14:30

## 2021-03-18 RX ADMIN — DEXAMETHASONE SODIUM PHOSPHATE 4 MG: 4 INJECTION, SOLUTION INTRAMUSCULAR; INTRAVENOUS at 12:52

## 2021-03-18 RX ADMIN — HYDROMORPHONE HYDROCHLORIDE 0.6 MG: 2 INJECTION INTRAMUSCULAR; INTRAVENOUS; SUBCUTANEOUS at 13:49

## 2021-03-18 RX ADMIN — KETAMINE HYDROCHLORIDE 20 MG: 50 INJECTION INTRAMUSCULAR; INTRAVENOUS at 13:05

## 2021-03-18 RX ADMIN — HYDROMORPHONE HYDROCHLORIDE 0.5 MG: 1 INJECTION, SOLUTION INTRAMUSCULAR; INTRAVENOUS; SUBCUTANEOUS at 14:45

## 2021-03-18 RX ADMIN — PHENYLEPHRINE HYDROCHLORIDE 50 MCG: 10 INJECTION INTRAVENOUS at 12:48

## 2021-03-18 RX ADMIN — HYDROMORPHONE HYDROCHLORIDE 0.5 MG: 1 INJECTION, SOLUTION INTRAMUSCULAR; INTRAVENOUS; SUBCUTANEOUS at 15:00

## 2021-03-18 RX ADMIN — Medication 10 MG: at 13:25

## 2021-03-18 RX ADMIN — PHENYLEPHRINE HYDROCHLORIDE 120 MCG: 10 INJECTION INTRAVENOUS at 13:03

## 2021-03-18 RX ADMIN — Medication 5 MG: at 12:53

## 2021-03-18 RX ADMIN — PHENYLEPHRINE HYDROCHLORIDE 120 MCG: 10 INJECTION INTRAVENOUS at 12:27

## 2021-03-18 NOTE — ANESTHESIA PREPROCEDURE EVALUATION
Relevant Problems   No relevant active problems       Anesthetic History     PONV          Review of Systems / Medical History  Patient summary reviewed and pertinent labs reviewed    Pulmonary  Within defined limits                 Neuro/Psych         TIA (no residiual deficits) and psychiatric history     Cardiovascular    Hypertension              Exercise tolerance: <4 METS: Limited by arthritis and chronic pain  Comments: Heart murmur as a child - asymptomatic    GI/Hepatic/Renal     GERD: well controlled      Hiatal hernia     Endo/Other        Obesity and arthritis     Other Findings   Comments: Significant chronic pain and arthritis - chronic opioids and SC stimulator (turned to surgery mode this morning)         Physical Exam    Airway  Mallampati: II  TM Distance: 4 - 6 cm  Neck ROM: normal range of motion   Mouth opening: Normal     Cardiovascular  Regular rate and rhythm,  S1 and S2 normal,  no murmur, click, rub, or gallop             Dental  No notable dental hx       Pulmonary  Breath sounds clear to auscultation               Abdominal  GI exam deferred       Other Findings            Anesthetic Plan    ASA: 3  Anesthesia type: general  ETT  Ketamine         Induction: Intravenous  Anesthetic plan and risks discussed with: Patient

## 2021-03-18 NOTE — DISCHARGE INSTRUCTIONS
Discharge Instructions    Wound Care and Showering  Your wound will typically be covered with a clear plastic dressing when you go home from the hospital. Since it is transparent, you will see the underlying gauze turn red with blood which is normal. You do not need to change the dressing unless it is leaking from the edges. Otherwise leave this dressing in place. The clear plastic dressing is waterproof so you can take a shower while it is on. You may remove the clear plastic dressing and the underlying gauze 3 days after surgery. There will be small tape strips under the gauze which should be left in place. If there is no leaking from the wound, you may take a shower and allow the tape strips to get wet. Some of them may fall off. The remaining strips will be removed once you return to the office. If there is persistent leaking when you first remove the clear dressing, apply new gauze and new clear plastic dressing (typically purchased at a pharmacy) over the wound. Hair washing is permissible in the shower. No tub baths, hot tubs or whirlpools until seen in the office. If any of the following should occur, please call the office:    -Persistent drainage from the incision site.  -Opening of incisions  -Fevers greater than 101 degrees  -Flu-like symptoms  -Increased redness    Exercise  You have unlimited walking and stair climbing privileges. Walking outside or walking on a treadmill without an incline is also allowed. Do NOT lift anything weighing greater than 10-15 lbs. Especially try to avoid lifting or reaching above your head. Sleeping  You may sleep in any comfortable position. Many patients find comfort sleeping in a recliner chair. It is normal to have difficulty sleeping for the first several weeks following your surgery. We recommend trying Benadryl, Melatonin, or Tylenol PM for help sleeping. All are over-the-counter and can be found in drugstores.      Eating  Because of the tubes in your throat while asleep during surgery, it is normal to have a sore throat and some difficulty swallowing solid foods after your surgery. This may persist for several weeks. Eating soft foods like yogurt, macaroni, and mashed potatoes seem to help. Today, you may have bland foods, nothing spicy or greasy. Pain  If you feel you need pain medicine, you may take regular or extra-strength Tylenol. Do NOT take an anti-inflammatory medication such as Advil, Aleve, or Motrin for the first 8 weeks following your surgery. Anti-inflammatory medications like these hinder bone growth and healing, which is critical in the weeks following surgery. Do NOT resume taking Foasamax for 8 weeks after your fusion surgery. To help alleviate persistent soreness around the shoulder blades, apply ice or warm moist compresses. Driving  You may NOT drive a car until told otherwise by your physician. You may be a passenger for short distances (about 20-30 minutes). If you must take a longer trip, be sure to make several pit stops so that you can walk and stretch your legs. Reclining in the passenger seat seems to be the most comfortable position for most patients. In some states, it is illegal to drive a car while wearing a neck brace. Follow up appointments  When you are discharged from the hospital, a follow up appointment will be made for 2-3 weeks from your surgery date. Call [phone] to confirm your appointment. After general anesthesia or intravenous sedation, for 24 hours or while taking prescription Narcotics:  · Limit your activities  · A responsible adult needs to be with you for the next 24 hours  · Do not drive and operate hazardous machinery  · Do not make important personal or business decisions  · Do not drink alcoholic beverages  · If you have not urinated within 8 hours after discharge, and you are experiencing discomfort from urinary retention, please go to the nearest ED.   · If you have sleep apnea and have a CPAP machine, please use it for all naps and sleeping. · Please use caution when taking narcotics and any of your home medications that may cause drowsiness. *  Please give a list of your current medications to your Primary Care Provider. *  Please update this list whenever your medications are discontinued, doses are      changed, or new medications (including over-the-counter products) are added. *  Please carry medication information at all times in case of emergency situations. These are general instructions for a healthy lifestyle:  No smoking/ No tobacco products/ Avoid exposure to second hand smoke  Surgeon General's Warning:  Quitting smoking now greatly reduces serious risk to your health. Obesity, smoking, and sedentary lifestyle greatly increases your risk for illness  A healthy diet, regular physical exercise & weight monitoring are important for maintaining a healthy lifestyle    You may be retaining fluid if you have a history of heart failure or if you experience any of the following symptoms:  Weight gain of 3 pounds or more overnight or 5 pounds in a week, increased swelling in our hands or feet or shortness of breath while lying flat in bed. Please call your doctor as soon as you notice any of these symptoms; do not wait until your next office visit.

## 2021-03-18 NOTE — PERIOP NOTES
3:59 PM  Verbal sign out from Rick ASHBY.    4:30 PM  Hafsa Lee MD called and notified that pt has been unable to urinate and sts he feels like he just doesn't have enough urine to do so. Orders to have patient go home and try to urinate, but if he hasn't urinated by 10 pm, to go to the ER for a catheter.

## 2021-03-18 NOTE — ANESTHESIA POSTPROCEDURE EVALUATION
Procedure(s):  RIGHT L4-5 SPINE LUMBAR LAMI-DISCECTOMY NEED EXTRA TECH.     general    Anesthesia Post Evaluation      Multimodal analgesia: multimodal analgesia used between 6 hours prior to anesthesia start to PACU discharge  Patient location during evaluation: PACU  Patient participation: complete - patient participated  Level of consciousness: awake and alert  Pain management: adequate  Airway patency: patent  Anesthetic complications: no  Cardiovascular status: acceptable  Respiratory status: acceptable  Hydration status: acceptable  Post anesthesia nausea and vomiting:  controlled  Final Post Anesthesia Temperature Assessment:  Normothermia (36.0-37.5 degrees C)      INITIAL Post-op Vital signs:   Vitals Value Taken Time   /84 03/18/21 1538   Temp 36.6 °C (97.8 °F) 03/18/21 1542   Pulse 96 03/18/21 1541   Resp 16 03/18/21 1541   SpO2 95 % 03/18/21 1542

## 2021-03-18 NOTE — BRIEF OP NOTE
Brief Postoperative Note    Patient: Steve Cano  YOB: 1955  MRN: [id number]    Date of Procedure: 3/18/2021     Pre-Op Diagnosis: Herniated lumbar intervertebral disc [M51.26]    Post-Op Diagnosis:right L4-5 HNP    Procedure(s):  RIGHT L4-5 SPINE LUMBAR LAMI-DISCECTOMY NEED EXTRA TECH    Surgeon(s):  Regulo Ochoa MD    Surgical Assistant: [email or web address]    Anesthesia: General     Estimated Blood Loss (mL): minimal    Complications:none    Specimens: * No specimens in log *     Implants: * No implants in log *    Drains: * No LDAs found *    Findings: large extruded HNP    Electronically Signed by Regulo Ochoa MD on 3/18/2021 at 1:42 PM

## 2021-03-19 NOTE — OP NOTES
[facility]  OPERATIVE REPORT    Name:  Sonia Gutierrez  MR#:  [id number]  :  1955  ACCOUNT #:  [de-identified]  DATE OF SERVICE:  2021    PREOPERATIVE DIAGNOSIS:  Right-sided L4-5 large extruded herniated disk. POSTOPERATIVE DIAGNOSIS:  Right-sided L4-5 large extruded herniated disk. PROCEDURE PERFORMED:  Right-sided L4-5 laminotomy, diskectomy and removal of extruded disk material. CPT                                                    code 08570    SURGEON:  Anton Betts MD    ASSISTANT:  Staff    ANESTHESIA:  GETA. COMPLICATIONS:  None. SPECIMENS REMOVED:  None. IMPLANTS:  None. ESTIMATED BLOOD LOSS:  Minimal.    FLUIDS:  500 mL of crystalloid. DRAINS:  None. INDICATIONS:  The patient is a 57-year-old gentleman who is a chronic pain management patient who had sustained an injury with acute worsening of his back pain and pain radiating down the right leg. He failed to get better with activity restriction and epidural steroid injections, so he is brought for surgical treatment because of intractable pain. PROCEDURE:  The patient was brought to the operating room. After administration of anesthesia, IV antibiotics and placement of monitoring lines, he was put supine on the [place] frame with a sling. His back was then prepped with Betadine and sterilely draped. At this point, surgeon called a time-out and confirmed the procedure to be performed, his allergy history and the fact that he had received preoperative IV antibiotics. C-arm was brought in. We identified the L4-5 level, marked it on the skin. He had a previous incision on his back where he had had surgery on the L5-S1 disk level the past.  We made a midline incision over this area, dissected down through the subcutaneous tissue to the deep fascia, then incised the right side of the spinous processes and dissected down the lamina out to the facet joint.   We placed an angled curette in the interlaminar space and confirmed we were at L4-5. We then burred down the lamina of L4 with a jayashree and used a Kerrison to trim out remaining bone. We removed the ligamentum flavum which was very stiff. We removed a small portion of the facet joint as well. We then dissected down and the thecal sac and nerve root were not mobile. It was difficult to really access the disk space. We cauterized the overlying epidural veins, incised the disk annulus with scalpel and used pituitaries to remove a large amount of disk material.  As we did this, we started seeing disk protruding out from underneath the thecal sac and we would grab the edge of this and pull pieces out and kept working distally finding pieces of disk. We removed several very large pieces of herniated disk. Once that was done, the thecal sac nerve roots were totally loose and free. We checked for more disk. We used a ball-tip hook to help retrieve disk material.  Once there was no further material, we cauterized all the bleeders, irrigated the wound, suctioned it dry, placed FloSeal over the decompression site, cauterized bleeders in the muscle. Then, we placed some vancomycin powder in the wound. Then we closed the wound by reapproximating the deep fascia with interrupted figure-of-eight stitches #1 Vicryl, subcutaneous tissue was closed with interrupted stitches of 2-0 Vicryl, and the skin was closed with a running subcuticular stitch of 3-0 Vicryl. Dermabond Prineo was applied to the incision and then a dry sterile dressing on top of that. The patient was then rolled supine onto the recovery room bed having tolerated the procedure well.       Azeb Bell MD      [id number]  D:  03/18/2021 14:00  T:  03/19/2021 0:35  JOB #:  [id number]  CC:  Venessa Nicole MD

## 2022-03-18 PROBLEM — M51.26 LUMBAR HERNIATED DISC: Status: ACTIVE | Noted: 2021-03-18

## 2022-10-07 ENCOUNTER — TELEPHONE (OUTPATIENT)
Dept: UROLOGY | Age: 67
End: 2022-10-07

## 2022-11-01 ENCOUNTER — HOSPITAL ENCOUNTER (EMERGENCY)
Dept: GENERAL RADIOLOGY | Age: 67
Discharge: HOME | End: 2022-11-04
Payer: MEDICARE

## 2022-11-01 ENCOUNTER — HOSPITAL ENCOUNTER (EMERGENCY)
Age: 67
Discharge: HOME | End: 2022-11-01
Attending: EMERGENCY MEDICINE
Payer: MEDICARE

## 2022-11-01 VITALS
WEIGHT: 258 LBS | TEMPERATURE: 98.4 F | HEART RATE: 59 BPM | RESPIRATION RATE: 10 BRPM | OXYGEN SATURATION: 100 % | SYSTOLIC BLOOD PRESSURE: 143 MMHG | HEIGHT: 73 IN | DIASTOLIC BLOOD PRESSURE: 73 MMHG | BODY MASS INDEX: 34.19 KG/M2

## 2022-11-01 DIAGNOSIS — M54.6 LEFT-SIDED THORACIC BACK PAIN, UNSPECIFIED CHRONICITY: Primary | ICD-10-CM

## 2022-11-01 LAB
ALBUMIN SERPL-MCNC: 4 G/DL (ref 3.2–4.6)
ALBUMIN/GLOB SERPL: 1.4 {RATIO} (ref 0.4–1.6)
ALP SERPL-CCNC: 95 U/L (ref 50–136)
ALT SERPL-CCNC: 23 U/L (ref 12–65)
ANION GAP SERPL CALC-SCNC: 5 MMOL/L (ref 2–11)
AST SERPL-CCNC: 15 U/L (ref 15–37)
BILIRUB SERPL-MCNC: 0.3 MG/DL (ref 0.2–1.1)
BUN SERPL-MCNC: 26 MG/DL (ref 8–23)
CALCIUM SERPL-MCNC: 9.5 MG/DL (ref 8.3–10.4)
CHLORIDE SERPL-SCNC: 109 MMOL/L (ref 101–110)
CO2 SERPL-SCNC: 26 MMOL/L (ref 21–32)
CREAT SERPL-MCNC: 1.6 MG/DL (ref 0.8–1.5)
EKG ATRIAL RATE: 86 BPM
EKG DIAGNOSIS: NORMAL
EKG P AXIS: 31 DEGREES
EKG P-R INTERVAL: 212 MS
EKG Q-T INTERVAL: 340 MS
EKG QRS DURATION: 78 MS
EKG QTC CALCULATION (BAZETT): 406 MS
EKG R AXIS: 36 DEGREES
EKG T AXIS: 32 DEGREES
EKG VENTRICULAR RATE: 86 BPM
ERYTHROCYTE [DISTWIDTH] IN BLOOD BY AUTOMATED COUNT: 14.7 % (ref 11.9–14.6)
GLOBULIN SER CALC-MCNC: 2.8 G/DL (ref 2.8–4.5)
GLUCOSE SERPL-MCNC: 116 MG/DL (ref 65–100)
HCT VFR BLD AUTO: 40.3 % (ref 41.1–50.3)
HGB BLD-MCNC: 13.6 G/DL (ref 13.6–17.2)
LIPASE SERPL-CCNC: 108 U/L (ref 73–393)
MCH RBC QN AUTO: 31.7 PG (ref 26.1–32.9)
MCHC RBC AUTO-ENTMCNC: 33.7 G/DL (ref 31.4–35)
MCV RBC AUTO: 93.9 FL (ref 82–102)
NRBC # BLD: 0 K/UL (ref 0–0.2)
PLATELET # BLD AUTO: 155 K/UL (ref 150–450)
PMV BLD AUTO: 10.5 FL (ref 9.4–12.3)
POTASSIUM SERPL-SCNC: 4.2 MMOL/L (ref 3.5–5.1)
PROT SERPL-MCNC: 6.8 G/DL (ref 6.3–8.2)
RBC # BLD AUTO: 4.29 M/UL (ref 4.23–5.6)
SODIUM SERPL-SCNC: 140 MMOL/L (ref 133–143)
TROPONIN I SERPL HS-MCNC: 5.1 PG/ML (ref 0–14)
TROPONIN I SERPL HS-MCNC: 7.8 PG/ML (ref 0–14)
WBC # BLD AUTO: 4.8 K/UL (ref 4.3–11.1)

## 2022-11-01 PROCEDURE — 80053 COMPREHEN METABOLIC PANEL: CPT

## 2022-11-01 PROCEDURE — 84484 ASSAY OF TROPONIN QUANT: CPT

## 2022-11-01 PROCEDURE — 71046 X-RAY EXAM CHEST 2 VIEWS: CPT

## 2022-11-01 PROCEDURE — 93005 ELECTROCARDIOGRAM TRACING: CPT | Performed by: EMERGENCY MEDICINE

## 2022-11-01 PROCEDURE — 83690 ASSAY OF LIPASE: CPT

## 2022-11-01 PROCEDURE — 85027 COMPLETE CBC AUTOMATED: CPT

## 2022-11-01 PROCEDURE — 6360000002 HC RX W HCPCS: Performed by: EMERGENCY MEDICINE

## 2022-11-01 PROCEDURE — 99285 EMERGENCY DEPT VISIT HI MDM: CPT

## 2022-11-01 PROCEDURE — 96374 THER/PROPH/DIAG INJ IV PUSH: CPT

## 2022-11-01 RX ORDER — HYDROMORPHONE HYDROCHLORIDE 1 MG/ML
1 INJECTION, SOLUTION INTRAMUSCULAR; INTRAVENOUS; SUBCUTANEOUS
Status: COMPLETED | OUTPATIENT
Start: 2022-11-01 | End: 2022-11-01

## 2022-11-01 RX ORDER — BACLOFEN 5 MG/1
TABLET ORAL
COMMUNITY
Start: 2022-10-17

## 2022-11-01 RX ADMIN — HYDROMORPHONE HYDROCHLORIDE 1 MG: 1 INJECTION, SOLUTION INTRAMUSCULAR; INTRAVENOUS; SUBCUTANEOUS at 07:45

## 2022-11-01 ASSESSMENT — ENCOUNTER SYMPTOMS
BACK PAIN: 1
BOWEL INCONTINENCE: 0
ABDOMINAL PAIN: 0
COUGH: 0
CONSTIPATION: 0
SORE THROAT: 0
RHINORRHEA: 0
DIARRHEA: 0
NAUSEA: 0
COLOR CHANGE: 0
SHORTNESS OF BREATH: 0
VOMITING: 0
ABDOMINAL SWELLING: 0

## 2022-11-01 ASSESSMENT — PAIN SCALES - GENERAL
PAINLEVEL_OUTOF10: 5
PAINLEVEL_OUTOF10: 7

## 2022-11-01 ASSESSMENT — PAIN DESCRIPTION - LOCATION: LOCATION: CHEST

## 2022-11-01 ASSESSMENT — PAIN - FUNCTIONAL ASSESSMENT: PAIN_FUNCTIONAL_ASSESSMENT: 0-10

## 2022-11-01 NOTE — ED TRIAGE NOTES
Pt presents with left shoulder pain that radiates into chest, unable to control pain, started on 10/22 but worse this morning, not worse with inspiration.

## 2022-11-01 NOTE — ED PROVIDER NOTES
Emergency Department Provider Note                   PCP:                None Provider               Age: 69 y.o. Sex: male       ICD-10-CM    1. Left-sided thoracic back pain, unspecified chronicity  M54.6           DISPOSITION Decision To Discharge 11/01/2022 08:47:51 AM       MDM  Number of Diagnoses or Management Options  Diagnosis management comments: Patient with chronic back pain. No acute on EKG chest x-ray or blood work. 2 negative troponins. Will discharge with pain management follow-up. Amount and/or Complexity of Data Reviewed  Clinical lab tests: ordered and reviewed  Tests in the radiology section of CPT®: ordered and reviewed  Tests in the medicine section of CPT®: ordered and reviewed    Patient Progress  Patient progress: stable             Orders Placed This Encounter   Procedures    XR CHEST (2 VW)    CBC    Comprehensive Metabolic Panel    Troponin    Lipase    Cardiac Monitor    Pulse Oximetry    EKG 12 Lead    Saline lock IV        Medications   HYDROmorphone HCl PF (DILAUDID) injection 1 mg (1 mg IntraVENous Given 11/1/22 0745)       New Prescriptions    No medications on file        Denice Robles is a 69 y.o. male who presents to the Emergency Department with chief complaint of    Chief Complaint   Patient presents with    Chest Pain      Patient with history of chronic back pain with a spinal cord stimulator in place. On OxyContin extended and immediate release throughout the day. Followed by pain management. October 22 had some left medial scapular pain sharp in nature. Different from his chronic back pain. Pain has been constant and worsened since then. Became much worse last night and this morning. Felt like it was going through to his chest.  Denies any shortness of breath nausea numbness or diaphoresis. Denies any chest pain. Comes in for evaluation. The history is provided by the patient. No  was used.    Back Pain  Location:  Thoracic spine  Quality: sharp. Radiates to:  L shoulder  Pain severity:  Moderate  Duration:  10 days  Timing:  Constant  Progression:  Worsening  Chronicity:  New  Relieved by:  Nothing  Worsened by:  Nothing  Associated symptoms: no abdominal pain, no abdominal swelling, no bladder incontinence, no bowel incontinence, no chest pain, no dysuria, no fever, no headaches, no leg pain, no numbness, no paresthesias, no perianal numbness, no tingling and no weakness      All other systems reviewed and are negative unless otherwise stated in the history of present illness section. Review of Systems   Constitutional:  Negative for chills and fever. HENT:  Negative for rhinorrhea and sore throat. Respiratory:  Negative for cough and shortness of breath. Cardiovascular:  Negative for chest pain and palpitations. Gastrointestinal:  Negative for abdominal pain, bowel incontinence, constipation, diarrhea, nausea and vomiting. Genitourinary:  Negative for bladder incontinence, dysuria and hematuria. Musculoskeletal:  Positive for back pain. Negative for neck pain. Skin:  Negative for color change and rash. Neurological:  Negative for tingling, weakness, numbness, headaches and paresthesias. All other systems reviewed and are negative.     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\", no Echo, inaudible today 3/11/21    Chronic kidney disease     \"last creatinine 1.6\" Fall 2019 - long term high dose NSAID use     Chronic pain     back    Chronic sinusitis     GERD (gastroesophageal reflux disease)     daily med- does have hiatal hernia, hx of esophogeal dilation, sleeps on side 2 pillows    Hiatal hernia     [place] (hard of hearing)     bilateral hearing aids    Hx-TIA (transient ischemic attack)     \"suspected\" 2005 [facility], however not able to confirm with CT    Hypertension     controlled w/med    PONV (postoperative nausea and vomiting)     IV antiemetics worked well    Prostate cancer ([address] 2001    hx of/prostatectomy 2001, returned 2005, 12 wks radiation    Sinus problem     Suspected sleep apnea     hasnt been tested, S.O has witnessed        Past Surgical History:   Procedure Laterality Date    ANKLE FRACTURE SURGERY  1995    ligament repair    APPENDECTOMY  1966    BIOPSY PROSTATE      IR IMPLANT SPINE NEURSTIM LEAD, GEN W FLU GDE  2018    LUMBAR LAMINECTOMY  1997    L5-S1 microsurgery    PROSTATECTOMY  2001    TONSILLECTOMY  1960s    UROLOGICAL SURGERY  2002    bladder neck        Family History   Problem Relation Age of Onset    Cancer Maternal Uncle     Hypertension Father     Heart Disease Father     Thyroid Disease Mother         Social History     Socioeconomic History    Marital status:      Spouse name: None    Number of children: None    Years of education: None    Highest education level: None   Tobacco Use    Smoking status: Never    Smokeless tobacco: Never   Substance and Sexual Activity    Alcohol use: Yes    Drug use: No        Allergies: Penicillins    Previous Medications    ACETAMINOPHEN (TYLENOL) 500 MG TABLET    Take 1,000 mg by mouth    ALLOPURINOL (ZYLOPRIM) 300 MG TABLET    Take 300 mg by mouth    ASPIRIN 81 MG CHEWABLE TABLET    Take 81 mg by mouth 2 times daily    DIAZEPAM (VALIUM) 10 MG TABLET    Take 10 mg by mouth every 8 hours as needed.     FESOTERODINE FUMARATE ER 8 MG TB24    Take 8 mg by mouth daily    IBUPROFEN (ADVIL;MOTRIN) 400 MG TABLET    Take 400 mg by mouth 2 times daily    LANSOPRAZOLE (PREVACID) 30 MG DELAYED RELEASE CAPSULE    Take 30 mg by mouth every evening    LISINOPRIL (PRINIVIL;ZESTRIL) 20 MG TABLET    Take 20 mg by mouth    MORPHINE (MS CONTIN) 15 MG EXTENDED RELEASE TABLET    TAKE 1 TABLET BY MOUTH EVERY 12 HOURS AS DIRECTED    OXYBUTYNIN (DITROPAN) 5 MG TABLET    Take 1 tablet by mouth as needed    OXYCODONE (OXYCONTIN) 10 MG EXTENDED RELEASE TABLET Take 10 mg by mouth every 12 hours. OXYCODONE 5 MG CAPSULE    Take 5 mg by mouth every 6 hours as needed. PAROXETINE (PAXIL) 20 MG TABLET    Take 20 mg by mouth        Vitals signs and nursing note reviewed. Patient Vitals for the past 4 hrs:   Temp Pulse Resp BP SpO2   11/01/22 0747 -- 65 15 (!) 151/80 98 %   11/01/22 0732 -- 63 15 (!) 147/80 98 %   11/01/22 0519 98.4 °F (36.9 °C) 88 20 (!) 142/76 96 %          Physical Exam  Vitals and nursing note reviewed. Constitutional:       Appearance: Normal appearance. HENT:      Head: Normocephalic and atraumatic. Cardiovascular:      Rate and Rhythm: Normal rate and regular rhythm. Pulmonary:      Effort: Pulmonary effort is normal.      Breath sounds: Normal breath sounds. No wheezing. Abdominal:      General: Bowel sounds are normal.      Palpations: Abdomen is soft. Tenderness: There is no abdominal tenderness. Musculoskeletal:         General: No swelling. Normal range of motion. Cervical back: Normal range of motion. No tenderness. Skin:     General: Skin is warm and dry. Neurological:      Mental Status: He is alert. Procedures    ED EKG Interpretation  EKG was interpreted in the absence of a cardiologist.    Rate: 86  EKG Interpretation: EKG Interpretation: sinus rhythm  ST Segments: Nonspecific ST segments - NO STEMI    Results for orders placed or performed during the hospital encounter of 11/01/22   XR CHEST (2 VW)    Narrative    EXAM: Chest x-ray. INDICATION: Chest pain. COMPARISON: None. TECHNIQUE: Frontal and lateral view chest x-ray. FINDINGS: The lungs are clear. The cardiac size, mediastinal contour and  pulmonary vasculature are normal. No pneumothorax or pleural effusion is seen. The bones are intact. A spinal stimulator lead projects into the lower thoracic  spinal canal.      Impression    No acute process.    CBC   Result Value Ref Range    WBC 4.8 4.3 - 11.1 K/uL    RBC 4.29 4.23 - 5.6 M/uL Hemoglobin 13.6 13.6 - 17.2 g/dL    Hematocrit 40.3 (L) 41.1 - 50.3 %    MCV 93.9 82 - 102 FL    MCH 31.7 26.1 - 32.9 PG    MCHC 33.7 31.4 - 35.0 g/dL    RDW 14.7 (H) 11.9 - 14.6 %    Platelets 919 629 - 870 K/uL    MPV 10.5 9.4 - 12.3 FL    nRBC 0.00 0.0 - 0.2 K/uL   Comprehensive Metabolic Panel   Result Value Ref Range    Sodium 140 133 - 143 mmol/L    Potassium 4.2 3.5 - 5.1 mmol/L    Chloride 109 101 - 110 mmol/L    CO2 26 21 - 32 mmol/L    Anion Gap 5 2 - 11 mmol/L    Glucose 116 (H) 65 - 100 mg/dL    BUN 26 (H) 8 - 23 MG/DL    Creatinine 1.60 (H) 0.8 - 1.5 MG/DL    Est, Glom Filt Rate 46 (L) >60 ml/min/1.73m2    Calcium 9.5 8.3 - 10.4 MG/DL    Total Bilirubin 0.3 0.2 - 1.1 MG/DL    ALT 23 12 - 65 U/L    AST 15 15 - 37 U/L    Alk Phosphatase 95 50 - 136 U/L    Total Protein 6.8 6.3 - 8.2 g/dL    Albumin 4.0 3.2 - 4.6 g/dL    Globulin 2.8 2.8 - 4.5 g/dL    Albumin/Globulin Ratio 1.4 0.4 - 1.6     Troponin   Result Value Ref Range    Troponin, High Sensitivity 5.1 0 - 14 pg/mL   Troponin   Result Value Ref Range    Troponin, High Sensitivity 7.8 0 - 14 pg/mL   Lipase   Result Value Ref Range    Lipase 108 73 - 393 U/L   EKG 12 Lead   Result Value Ref Range    Ventricular Rate 86 BPM    Atrial Rate 86 BPM    P-R Interval 212 ms    QRS Duration 78 ms    Q-T Interval 340 ms    QTc Calculation (Bazett) 406 ms    P Axis 31 degrees    R Axis 36 degrees    T Axis 32 degrees    Diagnosis Sinus rhythm with 1st degree A-V block         XR CHEST (2 VW)   Final Result   No acute process. Voice dictation software was used during the making of this note. This software is not perfect and grammatical and other typographical errors may be present. This note has not been completely proofread for errors.      Jennifer Deutsch III, MD  11/01/22 1254

## 2023-01-10 ENCOUNTER — OFFICE VISIT (OUTPATIENT)
Dept: UROLOGY | Age: 68
End: 2023-01-10
Payer: MEDICARE

## 2023-01-10 DIAGNOSIS — N39.41 URGE INCONTINENCE: Primary | ICD-10-CM

## 2023-01-10 DIAGNOSIS — C61 PROSTATE CA (HCC): ICD-10-CM

## 2023-01-10 LAB — PVR, POC: NORMAL CC

## 2023-01-10 PROCEDURE — 1036F TOBACCO NON-USER: CPT | Performed by: UROLOGY

## 2023-01-10 PROCEDURE — 51798 US URINE CAPACITY MEASURE: CPT | Performed by: UROLOGY

## 2023-01-10 PROCEDURE — G8417 CALC BMI ABV UP PARAM F/U: HCPCS | Performed by: UROLOGY

## 2023-01-10 PROCEDURE — 99204 OFFICE O/P NEW MOD 45 MIN: CPT | Performed by: UROLOGY

## 2023-01-10 PROCEDURE — 1123F ACP DISCUSS/DSCN MKR DOCD: CPT | Performed by: UROLOGY

## 2023-01-10 PROCEDURE — 3017F COLORECTAL CA SCREEN DOC REV: CPT | Performed by: UROLOGY

## 2023-01-10 PROCEDURE — G8484 FLU IMMUNIZE NO ADMIN: HCPCS | Performed by: UROLOGY

## 2023-01-10 PROCEDURE — G8428 CUR MEDS NOT DOCUMENT: HCPCS | Performed by: UROLOGY

## 2023-01-10 ASSESSMENT — ENCOUNTER SYMPTOMS
INDIGESTION: 1
CONSTIPATION: 1
SHORTNESS OF BREATH: 1
DIARRHEA: 1
VOMITING: 1
EYES NEGATIVE: 1
NAUSEA: 1
BACK PAIN: 1
HEARTBURN: 1
COUGH: 1

## 2023-01-10 NOTE — PROGRESS NOTES
[facility]  [address] [facility]    [address], [address] [facility]  [phone]          Opal Dang  : 1955    Chief Complaint   Patient presents with    Other     NGB           HPI     Opal Dang is a 69 y.o. male    The patient is having severe urinary frequency with urge incontinence. During the day he can go 2 to 3 hours without voiding but has significant urgency and will leak before and after voiding. He wears 1 pad per day. His biggest complaint is nocturia 6-8 times per night. He had a radical prostatectomy approximately 20 years ago and had biochemical recurrence treated with radiation. His PSA was undetectable in May 2020. He did get significant improvement in his voiding symptoms with Lexi Okolona but it caused significant dry mouth. Currently he is taking Ditropan 5 mg 1 p.o. as needed.       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\", no Echo, inaudible today 3/11/21    Chronic kidney disease     \"last creatinine 1.6\" Fall  - long term high dose NSAID use     Chronic pain     back    Chronic sinusitis     GERD (gastroesophageal reflux disease)     daily med- does have hiatal hernia, hx of esophogeal dilation, sleeps on side 2 pillows    Hiatal hernia     [place] (hard of hearing)     bilateral hearing aids    Hx-TIA (transient ischemic attack)     \"suspected\"  [address], however not able to confirm with CT    Hypertension     controlled w/med    PONV (postoperative nausea and vomiting)     IV antiemetics worked well    Prostate cancer ([address]     hx of/prostatectomy , returned , 12 wks radiation    Sinus problem     Suspected sleep apnea     hasnt been tested, S.O has witnessed     Past Surgical History:   Procedure Laterality Date    ANKLE FRACTURE SURGERY      ligament repair    [address] [employer]      IR IMPLANT SPINE NEURSTIM LEAD, GEN W FLU GDE  2018    LUMBAR LAMINECTOMY  1997    L5-S1 microsurgery    PROSTATECTOMY  2001    TONSILLECTOMY  1960s    UROLOGICAL SURGERY  2002    bladder neck     Current Outpatient Medications   Medication Sig Dispense Refill    Baclofen (LIORESAL) 5 MG tablet TAKE 1 TABLET BY MOUTH THREE TIMES DAILY AS NEEDED      acetaminophen (TYLENOL) 500 MG tablet Take 1,000 mg by mouth      allopurinol (ZYLOPRIM) 300 MG tablet Take 300 mg by mouth      aspirin 81 MG chewable tablet Take 81 mg by mouth 2 times daily      diazePAM (VALIUM) 10 MG tablet Take 10 mg by mouth every 8 hours as needed. Fesoterodine Fumarate ER 8 MG TB24 Take 8 mg by mouth daily      ibuprofen (ADVIL;MOTRIN) 400 MG tablet Take 400 mg by mouth 2 times daily      lansoprazole (PREVACID) 30 MG delayed release capsule Take 30 mg by mouth every evening      lisinopril (PRINIVIL;ZESTRIL) 20 MG tablet Take 20 mg by mouth      morphine (MS CONTIN) 15 MG extended release tablet TAKE 1 TABLET BY MOUTH EVERY 12 HOURS AS DIRECTED      oxybutynin (DITROPAN) 5 MG tablet Take 1 tablet by mouth as needed      oxyCODONE 5 MG capsule Take 5 mg by mouth every 6 hours as needed. oxyCODONE (OXYCONTIN) 10 MG extended release tablet Take 10 mg by mouth every 12 hours. PARoxetine (PAXIL) 20 MG tablet Take 20 mg by mouth       No current facility-administered medications for this visit.      Allergies   Allergen Reactions    Penicillins Other (See Comments)     Rxn as a child     Social History     Socioeconomic History    Marital status:      Spouse name: Not on file    Number of children: Not on file    Years of education: Not on file    Highest education level: Not on file   Occupational History    Not on file   Tobacco Use    Smoking status: Never    Smokeless tobacco: Never   Substance and Sexual Activity    Alcohol use: Yes    Drug use: No    Sexual activity: Not on file   Other Topics Concern    Not on file   Social History Narrative    Not on file     Social Determinants of Health     Financial Resource Strain: Not on file   Food Insecurity: Not on file   Transportation Needs: Not on file   Physical Activity: Not on file   Stress: Not on file   Social Connections: Not on file   Intimate Partner Violence: Not on file   Housing Stability: Not on file     Family History   Problem Relation Age of Onset    Cancer Maternal Uncle     Hypertension Father     Heart Disease Father     Thyroid Disease Mother        Review of Systems  Constitutional: Positive for fever, chills, appetite change, malaise/fatigue and headaches. Skin: Positive for rash and itching. Eyes: Eyes negative  ENT: Positive for pain swallowing, high frequency hearing loss and dry mouth. Respiratory: Positive for cough and shortness of breath. Cardiovascular: Positive for chest pain, hypertension and leg pain. GI: Positive for nausea, vomiting, constipation, diarrhea, indigestion and heartburn. Genitourinary: Positive for nocturia, slower stream, straining, urgency, leakage w/ urge, frequent urination, incomplete emptying and erectile dysfunction. Musculoskeletal: Positive for back pain, arthralgias, tenderness, muscle weakness and neck pain. Neurological: Positive for numbness. Psychological: Neg psych ROS  Endocrine: Positive for cold intolerance, thirst, excessive urination and fatigue. Hem/Lymphatic: Hematologic/lymphatic negative      Post void residual by ultrasound is 0. Assessment and Plan    ICD-10-CM    1. Urge incontinence  N39.41       2. Prostate CA ([facility] [address]  C61 PSA, ultrasensitive        We had discussed InterStim back in 2018. For now he would like to try additional medication. I given him samples of Myrbetriq 50 mg 1 p.o. daily dispense 56. He is cautioned regarding the risk of blood pressure elevation. Follow-up with me in 8 weeks. If medical therapy fails he is interested in having an InterStim.

## 2023-01-12 LAB — PSA SERPL DL<=0.01 NG/ML-MCNC: <0.006 NG/ML (ref 0–4)

## 2023-02-01 ENCOUNTER — PATIENT MESSAGE (OUTPATIENT)
Dept: UROLOGY | Age: 68
End: 2023-02-01

## 2023-03-07 ENCOUNTER — OFFICE VISIT (OUTPATIENT)
Dept: UROLOGY | Age: 68
End: 2023-03-07
Payer: MEDICARE

## 2023-03-07 DIAGNOSIS — C61 PROSTATE CA (HCC): ICD-10-CM

## 2023-03-07 DIAGNOSIS — C61 PROSTATE CA (HCC): Primary | ICD-10-CM

## 2023-03-07 DIAGNOSIS — N39.41 URGE INCONTINENCE: Primary | ICD-10-CM

## 2023-03-07 LAB
BILIRUBIN, URINE, POC: NORMAL
BLOOD URINE, POC: NORMAL
GLUCOSE URINE, POC: NEGATIVE
KETONES, URINE, POC: NORMAL
LEUKOCYTE ESTERASE, URINE, POC: NEGATIVE
NITRITE, URINE, POC: NEGATIVE
PH, URINE, POC: 5.5 (ref 4.6–8)
PROTEIN,URINE, POC: NEGATIVE
SPECIFIC GRAVITY, URINE, POC: 1.02 (ref 1–1.03)
URINALYSIS CLARITY, POC: NORMAL
URINALYSIS COLOR, POC: NORMAL
UROBILINOGEN, POC: NORMAL

## 2023-03-07 PROCEDURE — 1123F ACP DISCUSS/DSCN MKR DOCD: CPT | Performed by: UROLOGY

## 2023-03-07 PROCEDURE — 1036F TOBACCO NON-USER: CPT | Performed by: UROLOGY

## 2023-03-07 PROCEDURE — G8427 DOCREV CUR MEDS BY ELIG CLIN: HCPCS | Performed by: UROLOGY

## 2023-03-07 PROCEDURE — 81003 URINALYSIS AUTO W/O SCOPE: CPT | Performed by: UROLOGY

## 2023-03-07 PROCEDURE — G8417 CALC BMI ABV UP PARAM F/U: HCPCS | Performed by: UROLOGY

## 2023-03-07 PROCEDURE — 3017F COLORECTAL CA SCREEN DOC REV: CPT | Performed by: UROLOGY

## 2023-03-07 PROCEDURE — 99214 OFFICE O/P EST MOD 30 MIN: CPT | Performed by: UROLOGY

## 2023-03-07 PROCEDURE — G8484 FLU IMMUNIZE NO ADMIN: HCPCS | Performed by: UROLOGY

## 2023-03-07 ASSESSMENT — ENCOUNTER SYMPTOMS
EYES NEGATIVE: 1
RESPIRATORY NEGATIVE: 1

## 2023-03-07 NOTE — PROGRESS NOTES
[facility]  [address] [place] [address]    [address], [address] [facility]  [phone]          Pairsh Pina  : 1955    Chief Complaint   Patient presents with    Follow-up     Trial of myrbetriq          HPI     Parish Pina is a 69 y.o. male    The patient had to discontinue Myrbetriq because of headache. He felt that it was helping slightly with his urge incontinence. His PSA was undetectable on January 10, 2023.       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\", no Echo, inaudible today 3/11/21    Chronic kidney disease     \"last creatinine 1.6\" Fall  - long term high dose NSAID use     Chronic pain     back    Chronic sinusitis     GERD (gastroesophageal reflux disease)     daily med- does have hiatal hernia, hx of esophogeal dilation, sleeps on side 2 pillows    Hiatal hernia     [place] (hard of hearing)     bilateral hearing aids    Hx-TIA (transient ischemic attack)     \"suspected\"  [address], however not able to confirm with CT    Hypertension     controlled w/med    PONV (postoperative nausea and vomiting)     IV antiemetics worked well    Prostate cancer ([address]     hx of/prostatectomy , returned , 12 wks radiation    Sinus problem     Suspected sleep apnea     hasnt been tested, S.O has witnessed     Past Surgical History:   Procedure Laterality Date    ANKLE FRACTURE SURGERY      ligament repair    APPENDECTOMY      BIOPSY PROSTATE      IR IMPLANT SPINE NEURSTIM LEAD, GEN W FLU GDE  2018    LUMBAR LAMINECTOMY  1997    L5-S1 microsurgery    PROSTATECTOMY      [facility]SILLECTOMY  [facility]    UROLOGICAL SURGERY  2002    bladder neck     Current Outpatient Medications   Medication Sig Dispense Refill    Baclofen (LIORESAL) 5 MG tablet TAKE 1 TABLET BY MOUTH THREE TIMES DAILY AS NEEDED      acetaminophen (TYLENOL) 500 MG tablet Take 1,000 mg by mouth allopurinol (ZYLOPRIM) 300 MG tablet Take 300 mg by mouth      aspirin 81 MG chewable tablet Take 81 mg by mouth 2 times daily      diazePAM (VALIUM) 10 MG tablet Take 10 mg by mouth every 8 hours as needed. Fesoterodine Fumarate ER 8 MG TB24 Take 8 mg by mouth daily      ibuprofen (ADVIL;MOTRIN) 400 MG tablet Take 400 mg by mouth 2 times daily      lansoprazole (PREVACID) 30 MG delayed release capsule Take 30 mg by mouth every evening      lisinopril (PRINIVIL;ZESTRIL) 20 MG tablet Take 20 mg by mouth      morphine (MS CONTIN) 15 MG extended release tablet TAKE 1 TABLET BY MOUTH EVERY 12 HOURS AS DIRECTED      oxybutynin (DITROPAN) 5 MG tablet Take 1 tablet by mouth as needed      oxyCODONE 5 MG capsule Take 5 mg by mouth every 6 hours as needed. oxyCODONE (OXYCONTIN) 10 MG extended release tablet Take 10 mg by mouth every 12 hours. PARoxetine (PAXIL) 20 MG tablet Take 20 mg by mouth       No current facility-administered medications for this visit.      Allergies   Allergen Reactions    Penicillins Other (See Comments)     Rxn as a child     Social History     Socioeconomic History    Marital status:      Spouse name: Not on file    Number of children: Not on file    Years of education: Not on file    Highest education level: Not on file   Occupational History    Not on file   Tobacco Use    Smoking status: Never    Smokeless tobacco: Never   Substance and Sexual Activity    Alcohol use: Yes    Drug use: No    Sexual activity: Not on file   Other Topics Concern    Not on file   Social History Narrative    Not on file     Social Determinants of Health     Financial Resource Strain: Not on file   Food Insecurity: Not on file   Transportation Needs: Not on file   Physical Activity: Not on file   Stress: Not on file   Social Connections: Not on file   Intimate Partner Violence: Not on file   Housing Stability: Not on file     Family History   Problem Relation Age of Onset    Cancer Maternal Uncle Hypertension Father     Heart Disease Father     Thyroid Disease Mother        Review of Systems  Constitutional: Negative  Skin: Negative skin ROS  Eyes: Eyes negative  ENT: HENT negative  Respiratory: Respiratory negative  Cardiovascular: Positive for hypertension. GI: Neg GI ROS  Genitourinary: Genitourinary negative  Musculoskeletal: Musculoskeletal negative  Neurological: Neg neuro ROS  Psychological: Neg psych ROS  Endocrine: Endocrine negative  Hem/Lymphatic: Hematologic/lymphatic negative    Urinalysis  UA - Dipstick  Results for orders placed or performed in visit on 03/07/23   AMB POC URINALYSIS DIP STICK AUTO W/O MICRO   Result Value Ref Range    Color (UA POC)      Clarity (UA POC)      Glucose, Urine, POC Negative Negative    Bilirubin, Urine, POC Small Negative    KETONES, Urine, POC Trace Negative    Specific Gravity, Urine, POC 1.025 1.001 - 1.035    Blood (UA POC) Trace Negative    pH, Urine, POC 5.5 4.6 - 8.0    Protein, Urine, POC Negative Negative    Urobilinogen, POC Normal     Nitrite, Urine, POC Negative Negative    Leukocyte Esterase, Urine, POC Negative Negative       UA - Micro  WBC - 0  RBC - 0  Bacteria - 0  Epith - 0    Assessment and Plan    ICD-10-CM    1. Urge incontinence  N39.41       2. Prostate CA ([facility] [address]  C61 PSA, ultrasensitive     AMB POC URINALYSIS DIP STICK AUTO W/O MICRO        Once again we discussed InterStisuki but he is not ready to commit to this. I will give him a trial of Gemtesa 75 mg 1 p.o. daily. Once again he is cautioned regarding the risk of blood pressure elevation. I did give him the website for [employer] where he could read up on InterStim. To follow-up with me in 7 weeks.

## 2023-03-09 LAB — PSA SERPL DL<=0.01 NG/ML-MCNC: <0.006 NG/ML (ref 0–4)

## 2024-05-18 ENCOUNTER — TRANSCRIBE ORDERS (OUTPATIENT)
Dept: SCHEDULING | Age: 69
End: 2024-05-18

## 2024-05-18 DIAGNOSIS — M25.511 ACUTE PAIN OF RIGHT SHOULDER: Primary | ICD-10-CM

## 2024-06-20 ENCOUNTER — HOSPITAL ENCOUNTER (OUTPATIENT)
Dept: CT IMAGING | Age: 69
End: 2024-06-20
Payer: MEDICARE

## 2024-06-20 ENCOUNTER — HOSPITAL ENCOUNTER (OUTPATIENT)
Dept: CT IMAGING | Age: 69
Discharge: HOME | End: 2024-06-20
Payer: MEDICARE

## 2024-06-20 DIAGNOSIS — M54.12 CERVICAL RADICULOPATHY: ICD-10-CM

## 2024-06-20 DIAGNOSIS — M25.511 RIGHT SHOULDER PAIN, UNSPECIFIED CHRONICITY: ICD-10-CM

## 2024-06-20 LAB — CREAT BLD-MCNC: 1.42 MG/DL (ref 0.8–1.5)

## 2024-06-20 PROCEDURE — 73201 CT UPPER EXTREMITY W/DYE: CPT

## 2024-06-20 PROCEDURE — 72126 CT NECK SPINE W/DYE: CPT

## 2024-06-20 PROCEDURE — 82565 ASSAY OF CREATININE: CPT

## 2024-06-20 PROCEDURE — 6360000004 HC RX CONTRAST MEDICATION: Performed by: NURSE PRACTITIONER

## 2024-06-20 RX ADMIN — IOPAMIDOL 100 ML: 755 INJECTION, SOLUTION INTRAVENOUS at 13:35

## 2024-11-01 ENCOUNTER — HOSPITAL ENCOUNTER (EMERGENCY)
Age: 69
Discharge: HOME | End: 2024-11-01
Attending: EMERGENCY MEDICINE
Payer: MEDICARE

## 2024-11-01 VITALS
HEART RATE: 99 BPM | HEIGHT: 73 IN | RESPIRATION RATE: 18 BRPM | BODY MASS INDEX: 34.46 KG/M2 | WEIGHT: 260 LBS | SYSTOLIC BLOOD PRESSURE: 150 MMHG | DIASTOLIC BLOOD PRESSURE: 99 MMHG | OXYGEN SATURATION: 95 % | TEMPERATURE: 98 F

## 2024-11-01 DIAGNOSIS — T40.601A OPIATE OVERDOSE, ACCIDENTAL OR UNINTENTIONAL, INITIAL ENCOUNTER (HCC): Primary | ICD-10-CM

## 2024-11-01 LAB
EKG ATRIAL RATE: 85 BPM
EKG DIAGNOSIS: NORMAL
EKG P AXIS: 26 DEGREES
EKG P-R INTERVAL: 214 MS
EKG Q-T INTERVAL: 340 MS
EKG QRS DURATION: 89 MS
EKG QTC CALCULATION (BAZETT): 405 MS
EKG R AXIS: 45 DEGREES
EKG T AXIS: 35 DEGREES
EKG VENTRICULAR RATE: 85 BPM

## 2024-11-01 PROCEDURE — 93010 ELECTROCARDIOGRAM REPORT: CPT | Performed by: INTERNAL MEDICINE

## 2024-11-01 PROCEDURE — 99283 EMERGENCY DEPT VISIT LOW MDM: CPT

## 2024-11-01 PROCEDURE — 93005 ELECTROCARDIOGRAM TRACING: CPT

## 2024-11-01 ASSESSMENT — LIFESTYLE VARIABLES
HOW OFTEN DO YOU HAVE A DRINK CONTAINING ALCOHOL: PATIENT UNABLE TO ANSWER
HOW MANY STANDARD DRINKS CONTAINING ALCOHOL DO YOU HAVE ON A TYPICAL DAY: PATIENT DECLINED

## 2024-11-01 ASSESSMENT — PAIN DESCRIPTION - PAIN TYPE: TYPE: CHRONIC PAIN

## 2024-11-01 ASSESSMENT — PAIN SCALES - GENERAL: PAINLEVEL_OUTOF10: 5

## 2024-11-01 ASSESSMENT — PAIN DESCRIPTION - ONSET: ONSET: ON-GOING

## 2024-11-01 ASSESSMENT — PAIN - FUNCTIONAL ASSESSMENT
PAIN_FUNCTIONAL_ASSESSMENT: 0-10
PAIN_FUNCTIONAL_ASSESSMENT: PREVENTS OR INTERFERES SOME ACTIVE ACTIVITIES AND ADLS

## 2024-11-01 ASSESSMENT — PAIN DESCRIPTION - ORIENTATION: ORIENTATION: LOWER;MID

## 2024-11-01 ASSESSMENT — PAIN DESCRIPTION - LOCATION: LOCATION: BACK

## 2024-11-01 ASSESSMENT — PAIN DESCRIPTION - DESCRIPTORS: DESCRIPTORS: ACHING;SORE

## 2024-11-01 ASSESSMENT — PAIN DESCRIPTION - FREQUENCY: FREQUENCY: CONTINUOUS

## 2024-11-01 NOTE — DISCHARGE SUMMARY
I have reviewed discharge instructions with the patient.  The patient verbalized understanding.    Patient left ED via Discharge Method: wheelchair to lobby then ambulatory at baseline to Home with neighbor.    Opportunity for questions and clarification provided.       Patient given 1 scripts.         To continue your aftercare when you leave the hospital, you may receive an automated call from our care team to check in on how you are doing.  This is a free service and part of our promise to provide the best care and service to meet your aftercare needs.” If you have questions, or wish to unsubscribe from this service please call [phone].  Thank you for Choosing our [facility] Emergency Department.

## 2024-11-01 NOTE — DISCHARGE INSTRUCTIONS
You have been seen for your overdose today.  Is very important that you are cautious when taking your opiates or you take multiple doses.  I have given you another prescription for Narcan in case you need this in the future.  I would like you to follow-up with your primary care doctor to discuss other pain management options.  Return if you have any worsening symptoms or new concerns.

## 2024-11-01 NOTE — ED PROVIDER NOTES
Emergency Department Provider Note       PCP: Not, On File (Inactive)   Age: 69 y.o.   Sex: male     DISPOSITION Decision To Discharge 11/01/2024 06:00:34 PM    ICD-10-CM    1. Opiate overdose, accidental or unintentional, initial encounter ([facility])  T40.601A           Medical Decision Making     Differential diagnosis includes opioid overdose, medication error, Narcan use, partial list    Presents with complaints of some palpitations after Narcan use however this is relatively short-lived.  Is not in abnormal rhythm seen on cardiac monitor.  Patient otherwise does not any symptoms now.    Was observed for some time without any recurrence of his symptoms.  Pulse ox stayed in the mid 90s.  EKG shows normal sinus rhythm.    I do not think he needs any further evaluation for his accidental opioid overdose and Narcan use but have given him instructions to follow-up with his primary care physician discussed alternative pain management options and to be very careful with his extended release and immediate release oxycodone in the future.  Return precautions have given     1 acute, uncomplicated illness or injury.  Over the counter drug management performed.  Prescription drug management performed.  I independently ordered and reviewed each unique test.             ED provider's independent EKG interpretation normal sinus rhythm with first-degree AV block, normal axis, no significant ST elevation            History     69-year-old male with history of chronic back pain, takes both immediate release and extended release oxycodone that presents with concerns for accidental overdose.  States he was having some worsening with back pain this morning, took oxycodone extended release at 6 AM.  He tried to sleep for about another hour or 2, woke up and had worsening pain.  Then took oxycodone immediate release and diazepam for muscle spasms.  He took a nap earlier this afternoon and then woke up in his was feeling very groggy,  Arthritis     Benign heart murmur     \"Born with it, very mild and hard to hear\", no Echo, inaudible today 3/11/21    Chronic kidney disease     \"last creatinine 1.6\" Fall 2019 - long term high dose NSAID use     Chronic pain     back    Chronic sinusitis     GERD (gastroesophageal reflux disease)     daily med- does have hiatal hernia, hx of esophogeal dilation, sleeps on side 2 pillows    Hiatal hernia     [place] (hard of hearing)     bilateral hearing aids    Hx-TIA (transient ischemic attack)     \"suspected\" 2005 [place], however not able to confirm with CT    Hypertension     controlled w/med    PONV (postoperative nausea and vomiting)     IV antiemetics worked well    Prostate cancer (HCC) 2001    hx of/prostatectomy 2001, returned 2005, 12 wks radiation    Sinus problem     Suspected sleep apnea     hasnt been tested, S.O has witnessed        Past Surgical History:   Procedure Laterality Date    ANKLE FRACTURE SURGERY  1995    ligament repair    APPENDECTOMY  1966    BIOPSY PROSTATE      IR IMPLANT SPINE NEURSTIM LEAD, GEN W FLU GDE  2018    LUMBAR LAMINECTOMY  1997    L5-S1 microsurgery    PROSTATECTOMY  2001    TONSILLECTOMY  1960s    UROLOGICAL SURGERY  2002    bladder neck        Social History     Socioeconomic History    Marital status:    Tobacco Use    Smoking status: Never    Smokeless tobacco: Never   Substance and Sexual Activity    Alcohol use: Yes    Drug use: No     Social Determinants of Health     Physical Activity: Insufficiently Active (3/20/2021)    Received from [employer]    Physical Activity     Days of Exercise per Week: 7     Minutes of Exercise per Session: 20     Total Minutes of Exercise per Week: 140   Social Connections: Unknown (3/20/2021)    Received from [employer]    Social Connections     Frequency of Communication with Friends and Family: Not asked     Frequency of Social Gatherings with Friends and Family: Not asked   Intimate

## 2024-11-01 NOTE — ED TRIAGE NOTES
Pt arrives via [facility] from home for complaint of chronic back pain. Pt has not been getting relief from his oxycodone or valium. This afternoon pt took a nap and woke up with palpitations for which he took 8mg intranasal narcan and 324 ASA. Fire dept was first to arrives to the scene and they gave the pt 0.4mg nitro.

## 2025-08-12 ENCOUNTER — TELEPHONE (OUTPATIENT)
Dept: UROLOGY | Age: 70
End: 2025-08-12

## 2025-08-19 ENCOUNTER — OFFICE VISIT (OUTPATIENT)
Dept: UROLOGY | Age: 70
End: 2025-08-19
Payer: MEDICARE

## 2025-08-19 DIAGNOSIS — N52.9 ERECTILE DYSFUNCTION, UNSPECIFIED ERECTILE DYSFUNCTION TYPE: ICD-10-CM

## 2025-08-19 DIAGNOSIS — R39.198 OTHER DIFFICULTIES WITH MICTURITION: ICD-10-CM

## 2025-08-19 DIAGNOSIS — R68.82 DECREASED LIBIDO: ICD-10-CM

## 2025-08-19 DIAGNOSIS — N39.46 MIXED INCONTINENCE: Primary | ICD-10-CM

## 2025-08-19 DIAGNOSIS — Z85.46 HISTORY OF PROSTATE CANCER: ICD-10-CM

## 2025-08-19 LAB
BILIRUBIN, URINE, POC: NEGATIVE
BLOOD URINE, POC: NEGATIVE
GLUCOSE URINE, POC: NEGATIVE MG/DL
KETONES, URINE, POC: NEGATIVE MG/DL
LEUKOCYTE ESTERASE, URINE, POC: NEGATIVE
NITRITE, URINE, POC: NEGATIVE
PH, URINE, POC: 5.5 (ref 4.6–8)
PROTEIN,URINE, POC: NEGATIVE MG/DL
PVR, POC: 0 CC
SPECIFIC GRAVITY, URINE, POC: 1.03 (ref 1–1.03)
URINALYSIS CLARITY, POC: NORMAL
URINALYSIS COLOR, POC: NORMAL
UROBILINOGEN, POC: NORMAL MG/DL

## 2025-08-19 PROCEDURE — 1123F ACP DISCUSS/DSCN MKR DOCD: CPT | Performed by: PHYSICIAN ASSISTANT

## 2025-08-19 PROCEDURE — 3017F COLORECTAL CA SCREEN DOC REV: CPT | Performed by: PHYSICIAN ASSISTANT

## 2025-08-19 PROCEDURE — 1036F TOBACCO NON-USER: CPT | Performed by: PHYSICIAN ASSISTANT

## 2025-08-19 PROCEDURE — 99215 OFFICE O/P EST HI 40 MIN: CPT | Performed by: PHYSICIAN ASSISTANT

## 2025-08-19 PROCEDURE — G8427 DOCREV CUR MEDS BY ELIG CLIN: HCPCS | Performed by: PHYSICIAN ASSISTANT

## 2025-08-19 PROCEDURE — 81003 URINALYSIS AUTO W/O SCOPE: CPT | Performed by: PHYSICIAN ASSISTANT

## 2025-08-19 PROCEDURE — G8417 CALC BMI ABV UP PARAM F/U: HCPCS | Performed by: PHYSICIAN ASSISTANT

## 2025-08-19 PROCEDURE — 1159F MED LIST DOCD IN RCRD: CPT | Performed by: PHYSICIAN ASSISTANT

## 2025-08-19 PROCEDURE — 1160F RVW MEDS BY RX/DR IN RCRD: CPT | Performed by: PHYSICIAN ASSISTANT

## 2025-08-19 PROCEDURE — 51798 US URINE CAPACITY MEASURE: CPT | Performed by: PHYSICIAN ASSISTANT

## 2025-08-19 RX ORDER — TADALAFIL 20 MG/1
20 TABLET ORAL DAILY PRN
Qty: 30 TABLET | Refills: 5 | Status: SHIPPED | OUTPATIENT
Start: 2025-08-19

## (undated) DEVICE — DRAPE SHT 3 QTR PROXIMA 53X77 --

## (undated) DEVICE — 3M™ STERI-DRAPE™ INSTRUMENT POUCH 1018: Brand: STERI-DRAPE™

## (undated) DEVICE — POSTERIOR LAMI VANPLT-LUCAS: Brand: MEDLINE INDUSTRIES, INC.

## (undated) DEVICE — CARDINAL HEALTH FLEXIBLE LIGHT HANDLE COVER: Brand: CARDINAL HEALTH

## (undated) DEVICE — SUTURE VCRL SZ 1 L27IN ABSRB UD L36MM CP-1 1/2 CIR REV CUT J268H

## (undated) DEVICE — REM POLYHESIVE ADULT PATIENT RETURN ELECTRODE: Brand: VALLEYLAB

## (undated) DEVICE — 3M™ TEGADERM™ TRANSPARENT FILM DRESSING FRAME STYLE, 1626W, 4 IN X 4-3/4 IN (10 CM X 12 CM), 50/CT 4CT/CASE: Brand: 3M™ TEGADERM™

## (undated) DEVICE — DRAPE XR C ARM 41X74IN LF --

## (undated) DEVICE — NEEDLE HYPO 21GA L1.5IN INTRAMUSCULAR S STL LATCH BVL UP

## (undated) DEVICE — AGENT HEMSTAT 8ML FLX TIP MTRX + DISP SURGIFLO

## (undated) DEVICE — SYR 10ML LUER LOK 1/5ML GRAD --

## (undated) DEVICE — BONE WAX WHITE: Brand: BONE WAX WHITE

## (undated) DEVICE — STRIP SKIN CLSR W1XL5IN NYL REINF CURAD

## (undated) DEVICE — INTENDED FOR TISSUE SEPARATION, AND OTHER PROCEDURES THAT REQUIRE A SHARP SURGICAL BLADE TO PUNCTURE OR CUT.: Brand: BARD-PARKER SAFETY BLADES SIZE 10, STERILE

## (undated) DEVICE — PACKING 8004003 NEURAY 200PK 25X25MM: Brand: NEURAY ®

## (undated) DEVICE — 3M™ IOBAN™ 2 ANTIMICROBIAL INCISE DRAPE 6650EZ: Brand: IOBAN™ 2

## (undated) DEVICE — SUTURE VCRL SZ 2-0 L27IN ABSRB UD L36MM CP-1 1/2 CIR REV J266H

## (undated) DEVICE — SUTURE VCRL + 3-0 L27IN ABSRB UD PS-2 L19MM 3/8 CIR PRIM VCP427H

## (undated) DEVICE — PREP SKN CHLRAPRP APL 26ML STR --

## (undated) DEVICE — 1010 S-DRAPE TOWEL DRAPE 10/BX: Brand: STERI-DRAPE™

## (undated) DEVICE — JACKSON TABLE POSITIONER KIT: Brand: MEDLINE INDUSTRIES, INC.

## (undated) DEVICE — KIT EVAC 0.13IN RECT TB DIA10FR 400CC PVC 3 SPR Y CONN DRN

## (undated) DEVICE — INTENDED FOR TISSUE SEPARATION, AND OTHER PROCEDURES THAT REQUIRE A SHARP SURGICAL BLADE TO PUNCTURE OR CUT.: Brand: BARD-PARKER SAFETY BLADES SIZE 15, STERILE

## (undated) DEVICE — SOLUTION IV 1000ML 0.9% SOD CHL

## (undated) DEVICE — MASTISOL ADHESIVE LIQ 2/3ML

## (undated) DEVICE — SPONGE LAP 18X18IN STRL -- 5/PK

## (undated) DEVICE — 5.0MM PRECISION ROUND